# Patient Record
Sex: MALE | Race: WHITE | NOT HISPANIC OR LATINO | Employment: OTHER | URBAN - METROPOLITAN AREA
[De-identification: names, ages, dates, MRNs, and addresses within clinical notes are randomized per-mention and may not be internally consistent; named-entity substitution may affect disease eponyms.]

---

## 2018-10-15 ENCOUNTER — HOSPITAL ENCOUNTER (EMERGENCY)
Facility: HOSPITAL | Age: 63
Discharge: HOME | End: 2018-10-15
Attending: EMERGENCY MEDICINE
Payer: COMMERCIAL

## 2018-10-15 VITALS
TEMPERATURE: 98.5 F | SYSTOLIC BLOOD PRESSURE: 106 MMHG | RESPIRATION RATE: 16 BRPM | HEART RATE: 62 BPM | DIASTOLIC BLOOD PRESSURE: 62 MMHG | OXYGEN SATURATION: 98 %

## 2018-10-15 DIAGNOSIS — R19.7 DIARRHEA: Primary | ICD-10-CM

## 2018-10-15 LAB
ALBUMIN SERPL BCP-MCNC: 3.9 G/DL (ref 3.5–5)
ALP SERPL-CCNC: 95 U/L (ref 46–116)
ALT SERPL W P-5'-P-CCNC: 60 U/L (ref 12–78)
ANION GAP SERPL CALCULATED.3IONS-SCNC: 10 MMOL/L (ref 4–13)
AST SERPL W P-5'-P-CCNC: 16 U/L (ref 5–45)
ATRIAL RATE: 64 BPM
BASOPHILS # BLD AUTO: 0.01 THOUSANDS/ΜL (ref 0–0.1)
BASOPHILS NFR BLD AUTO: 0 % (ref 0–1)
BILIRUB SERPL-MCNC: 0.5 MG/DL (ref 0.2–1)
BUN SERPL-MCNC: 22 MG/DL (ref 5–25)
CALCIUM SERPL-MCNC: 8.8 MG/DL (ref 8.3–10.1)
CHLORIDE SERPL-SCNC: 104 MMOL/L (ref 100–108)
CO2 SERPL-SCNC: 19 MMOL/L (ref 21–32)
CREAT SERPL-MCNC: 1.03 MG/DL (ref 0.6–1.3)
EOSINOPHIL # BLD AUTO: 0.02 THOUSAND/ΜL (ref 0–0.61)
EOSINOPHIL NFR BLD AUTO: 1 % (ref 0–6)
ERYTHROCYTE [DISTWIDTH] IN BLOOD BY AUTOMATED COUNT: 13.7 % (ref 11.6–15.1)
GFR SERPL CREATININE-BSD FRML MDRD: 77 ML/MIN/1.73SQ M
GLUCOSE SERPL-MCNC: 115 MG/DL (ref 65–140)
HCT VFR BLD AUTO: 45 % (ref 36.5–49.3)
HGB BLD-MCNC: 15.2 G/DL (ref 12–17)
IMM GRANULOCYTES # BLD AUTO: 0.01 THOUSAND/UL (ref 0–0.2)
IMM GRANULOCYTES NFR BLD AUTO: 0 % (ref 0–2)
LIPASE SERPL-CCNC: 116 U/L (ref 73–393)
LYMPHOCYTES # BLD AUTO: 0.51 THOUSANDS/ΜL (ref 0.6–4.47)
LYMPHOCYTES NFR BLD AUTO: 12 % (ref 14–44)
MAGNESIUM SERPL-MCNC: 1.8 MG/DL (ref 1.6–2.6)
MCH RBC QN AUTO: 31.1 PG (ref 26.8–34.3)
MCHC RBC AUTO-ENTMCNC: 33.8 G/DL (ref 31.4–37.4)
MCV RBC AUTO: 92 FL (ref 82–98)
MONOCYTES # BLD AUTO: 0.62 THOUSAND/ΜL (ref 0.17–1.22)
MONOCYTES NFR BLD AUTO: 14 % (ref 4–12)
NEUTROPHILS # BLD AUTO: 3.13 THOUSANDS/ΜL (ref 1.85–7.62)
NEUTS SEG NFR BLD AUTO: 73 % (ref 43–75)
NRBC BLD AUTO-RTO: 0 /100 WBCS
P AXIS: 60 DEGREES
PHOSPHATE SERPL-MCNC: 2.5 MG/DL (ref 2.3–4.1)
PLATELET # BLD AUTO: 143 THOUSANDS/UL (ref 149–390)
PMV BLD AUTO: 10.2 FL (ref 8.9–12.7)
POTASSIUM SERPL-SCNC: 4.3 MMOL/L (ref 3.5–5.3)
PR INTERVAL: 230 MS
PROT SERPL-MCNC: 7.3 G/DL (ref 6.4–8.2)
QRS AXIS: -15 DEGREES
QRSD INTERVAL: 86 MS
QT INTERVAL: 384 MS
QTC INTERVAL: 396 MS
RBC # BLD AUTO: 4.89 MILLION/UL (ref 3.88–5.62)
SODIUM SERPL-SCNC: 133 MMOL/L (ref 136–145)
T WAVE AXIS: 52 DEGREES
VENTRICULAR RATE: 64 BPM
WBC # BLD AUTO: 4.3 THOUSAND/UL (ref 4.31–10.16)

## 2018-10-15 PROCEDURE — 83735 ASSAY OF MAGNESIUM: CPT | Performed by: EMERGENCY MEDICINE

## 2018-10-15 PROCEDURE — 93005 ELECTROCARDIOGRAM TRACING: CPT

## 2018-10-15 PROCEDURE — 96360 HYDRATION IV INFUSION INIT: CPT

## 2018-10-15 PROCEDURE — 84100 ASSAY OF PHOSPHORUS: CPT | Performed by: EMERGENCY MEDICINE

## 2018-10-15 PROCEDURE — 99284 EMERGENCY DEPT VISIT MOD MDM: CPT

## 2018-10-15 PROCEDURE — 36415 COLL VENOUS BLD VENIPUNCTURE: CPT | Performed by: EMERGENCY MEDICINE

## 2018-10-15 PROCEDURE — 85025 COMPLETE CBC W/AUTO DIFF WBC: CPT | Performed by: EMERGENCY MEDICINE

## 2018-10-15 PROCEDURE — 83690 ASSAY OF LIPASE: CPT | Performed by: EMERGENCY MEDICINE

## 2018-10-15 PROCEDURE — 93010 ELECTROCARDIOGRAM REPORT: CPT | Performed by: INTERNAL MEDICINE

## 2018-10-15 PROCEDURE — 80053 COMPREHEN METABOLIC PANEL: CPT | Performed by: EMERGENCY MEDICINE

## 2018-10-15 RX ORDER — DICYCLOMINE HCL 20 MG
20 TABLET ORAL ONCE
Status: COMPLETED | OUTPATIENT
Start: 2018-10-15 | End: 2018-10-15

## 2018-10-15 RX ADMIN — DICYCLOMINE HYDROCHLORIDE 20 MG: 20 TABLET ORAL at 08:30

## 2018-10-15 RX ADMIN — SODIUM CHLORIDE 1000 ML: 0.9 INJECTION, SOLUTION INTRAVENOUS at 08:49

## 2018-10-15 NOTE — DISCHARGE INSTRUCTIONS
Acute Diarrhea   WHAT YOU NEED TO KNOW:   What is acute diarrhea? Acute diarrhea starts quickly and lasts a short time, usually 1 to 3 days  It can last up to 2 weeks  What causes acute diarrhea? · Bacteria, such as E coli or salmonella    · Viruses, such as rotavirus and norovirus    · A parasite, such as giardia    · Medicines, such as laxatives, antacids, or antibiotics    · An allergy to lactose, soy, or gluten    · Eating food or drinking water that contains germs    · Medical treatments, such as chemotherapy or radiation  What other signs and symptoms might I have with acute diarrhea? You may have 3 or more episodes of diarrhea  It may be hard to control your diarrhea  You may also have any of the following:  · Fever and chills    · Headache or abdominal pain    · Nausea and vomiting    · Symptoms of dehydration such as thirst, decreased urination, dry skin, sunken eyes, or fast, pounding heartbeat  What does my healthcare provider need to know about my acute diarrhea? Your healthcare provider will ask about your symptoms  He or she will ask what you have recently eaten and if you have traveled to other countries  Tell the provider what medicines you use or if you have been around anyone who is sick  Your healthcare provider may check you for signs of dehydration  How is acute diarrhea treated? Acute diarrhea usually gets better without treatment  You may need any of the following if your diarrhea is severe or lasts longer than a few days:  · Diarrhea medicine  is an over-the-counter medicine that helps slow or stop your diarrhea  · Antibiotics  may be given to help treat an infection caused by bacteria  · Parasite medicine  may be given to treat an infection caused by parasites  How can acute diarrhea be managed? · Drink liquids as directed  Liquids will help prevent dehydration caused by diarrhea   Ask your healthcare provider how much liquid to drink each day and which liquids are best for you  You may need to drink an oral rehydration solution (ORS)  An ORS has the right amounts of water, salts, and sugar you need to replace body fluids  You can buy an ORS at most grocery stores and pharmacies  · Eat foods that are easy to digest   Examples include rice, lentils, cereal, bananas, potatoes, and bread  It also includes some fruits (bananas, melon), well-cooked vegetables, and lean meats  Avoid foods high in fiber, fat, and sugar  Also avoid caffeine, alcohol, dairy, and red meat until your diarrhea is gone  How can acute diarrhea be prevented? · Wash your hands often  Use soap and water  Wash your hands before you eat or prepare food  Also wash your hands after you use the bathroom  Use an alcohol-based hand gel when soap and water are not available  · Keep bathroom surfaces clean  This helps prevent the spread of germs that cause acute diarrhea  · Wash fruits and vegetables well before you eat them  This can help remove germs that cause diarrhea  If possible, remove the skin from fruits and vegetables, or cook them well before you eat them  · Cook meat as directed  ¨ Cook ground meat  to 160°F      ¨ Cook ground poultry, whole poultry, or cuts of poultry  to at least 165°F  Remove the meat from heat  Let it stand for 3 minutes before you eat it  ¨ Cook whole cuts of meat other than poultry  to at least 145°F  Remove the meat from heat  Let it stand for 3 minutes before you eat it  · Wash dishes that have touched raw meat with hot water and soap  This includes cutting boards, utensils, dishes, and serving containers  · Place raw or cooked meat in the refrigerator as soon as possible  Bacteria can grow in meat that is left at room temperature too long  · Do not eat raw or undercooked oysters, clams, or mussels  These foods may be contaminated and cause infection  · Drink filtered or treated water only when you travel  Do not put ice in your drinks  Drink bottled water whenever possible  When should I seek immediate care? · You feel confused  · Your heartbeat is faster than normal      · Your eyes look deeply sunken, or you have no tears when you cry  · You urinate less than usual, or your urine is dark yellow  · You have blood or mucus in your stools  · You have severe abdominal pain  · You are unable to drink any liquids  When should I contact my healthcare provider? · Your symptoms do not get better with treatment  · You have a fever higher than 101 3°F (38 5°C)  · You have trouble eating and drinking because you are vomiting  · You are thirsty or have a dry mouth  · Your diarrhea does not get better in 7 days  · You have questions or concerns about your condition or care  CARE AGREEMENT:   You have the right to help plan your care  Learn about your health condition and how it may be treated  Discuss treatment options with your caregivers to decide what care you want to receive  You always have the right to refuse treatment  The above information is an  only  It is not intended as medical advice for individual conditions or treatments  Talk to your doctor, nurse or pharmacist before following any medical regimen to see if it is safe and effective for you  © 2017 [employer]  Information is for End User's use only and may not be sold, redistributed or otherwise used for commercial purposes  All illustrations and images included in CareNotes® are the copyrighted property of A [employer] M [employer]  or Francisco Swenson

## 2018-10-15 NOTE — ED PROVIDER NOTES
Pt Name: Maverick Jon  MRN: [id number]  [place] 1955  Age/Sex: 58 y o  male  Date of evaluation: 10/15/2018  PCP: Lei Barry, [address] [place] [address]    Chief Complaint   Patient presents with    Diarrhea     pt with diarrhea since Friday  Unimproved symptoms since  HPI    Gil Floor presents to the Emergency Department complaining of diarrhea  No nausea or vomiting  He has had several days of watery diarrhea and then had started to feel better  The diarrhea returned after he ate pasta with sausage and peppers  HPI      Past Medical and Surgical History    Past Medical History:   Diagnosis Date    Mehta esophagus     Gastric bypass status for obesity     Hypertension        Past Surgical History:   Procedure Laterality Date    TOTAL HIP ARTHROPLASTY Right        History reviewed  No pertinent family history  Social History   Substance Use Topics    Smoking status: Former Smoker    Smokeless tobacco: Never Used    Alcohol use No              Allergies    Allergies   Allergen Reactions    Penicillins        Home Medications    Prior to Admission medications    Not on File           Review of Systems    Review of Systems   Constitutional: Negative for activity change, appetite change, chills, fatigue and fever  HENT: Negative for congestion, rhinorrhea, sinus pressure, sneezing, sore throat and trouble swallowing  Eyes: Negative for photophobia and visual disturbance  Respiratory: Negative for chest tightness, shortness of breath and wheezing  Cardiovascular: Negative for chest pain and leg swelling  Gastrointestinal: Positive for diarrhea  Negative for abdominal distention, abdominal pain, constipation, nausea and vomiting  Endocrine: Negative for polydipsia, polyphagia and polyuria  Genitourinary: Negative for decreased urine volume, difficulty urinating, dysuria, flank pain, frequency and urgency     Musculoskeletal: Negative for back pain, gait problem, joint swelling and neck pain  Skin: Negative for color change, pallor and rash  Allergic/Immunologic: Negative for immunocompromised state  Neurological: Negative for seizures, syncope, speech difficulty, weakness, light-headedness and headaches  Psychiatric/Behavioral: Negative for confusion  All other systems reviewed and are negative  Physical Exam      ED Triage Vitals [10/15/18 0801]   Temperature Pulse Respirations Blood Pressure SpO2   98 5 °F (36 9 °C) 68 18 114/66 98 %      Temp Source Heart Rate Source Patient Position - Orthostatic VS BP Location FiO2 (%)   Oral Monitor Sitting Right arm --      Pain Score       No Pain               Physical Exam   Constitutional: He is oriented to person, place, and time  He appears well-developed and well-nourished  No distress  HENT:   Head: Normocephalic and atraumatic  Nose: Nose normal    Mouth/Throat: Oropharynx is clear and moist    Eyes: Pupils are equal, round, and reactive to light  Conjunctivae and EOM are normal    Neck: Normal range of motion  Neck supple  Cardiovascular: Normal rate, regular rhythm and normal heart sounds  Exam reveals no gallop and no friction rub  No murmur heard  Pulmonary/Chest: Effort normal and breath sounds normal  No respiratory distress  He has no wheezes  He has no rales  Abdominal: Soft  Bowel sounds are normal  There is no tenderness  There is no rebound and no guarding  Musculoskeletal: Normal range of motion  Neurological: He is alert and oriented to person, place, and time  Skin: Skin is warm and dry  He is not diaphoretic  Psychiatric: He has a normal mood and affect  His behavior is normal    Nursing note and vitals reviewed  Assessment and Plan    Ana Wyatt is a 58 y o  male who presents with diarrhea and feeling weak  Physical examination unremarkable  Differential diagnosis (not completely inclusive) includes viral/ food borne   Plan will be to perform diagnostic testing and treat symptomatically  MDM    Diagnostic Results      EKG INTERPRETATION  EKG Interpretation    Rate: 64 BPM  Rhythm: sinus  Axis: normal  Intervals: Normal, no blocks, QTc 396ms  T waves: non-specific  ST segments: non-specific    Impression: non-diagnostic EKG  EKG for comparison: none available  EKG interpreted by me  Interpretation by Michael Todd DO  EKG reviewed and interpreted independently      Labs:    Results for orders placed or performed during the hospital encounter of 10/15/18   CBC and differential   Result Value Ref Range    WBC 4 30 (L) 4 31 - 10 16 Thousand/uL    RBC 4 89 3 88 - 5 62 Million/uL    Hemoglobin 15 2 12 0 - 17 0 g/dL    Hematocrit 45 0 36 5 - 49 3 %    MCV 92 82 - 98 fL    MCH 31 1 26 8 - 34 3 pg    MCHC 33 8 31 4 - 37 4 g/dL    RDW 13 7 11 6 - 15 1 %    MPV 10 2 8 9 - 12 7 fL    Platelets 369 (L) 368 - 390 Thousands/uL    nRBC 0 /100 WBCs    Neutrophils Relative 73 43 - 75 %    Immat GRANS % 0 0 - 2 %    Lymphocytes Relative 12 (L) 14 - 44 %    Monocytes Relative 14 (H) 4 - 12 %    Eosinophils Relative 1 0 - 6 %    Basophils Relative 0 0 - 1 %    Neutrophils Absolute 3 13 1 85 - 7 62 Thousands/µL    Immature Grans Absolute 0 01 0 00 - 0 20 Thousand/uL    Lymphocytes Absolute 0 51 (L) 0 60 - 4 47 Thousands/µL    Monocytes Absolute 0 62 0 17 - 1 22 Thousand/µL    Eosinophils Absolute 0 02 0 00 - 0 61 Thousand/µL    Basophils Absolute 0 01 0 00 - 0 10 Thousands/µL   Comprehensive metabolic panel   Result Value Ref Range    Sodium 133 (L) 136 - 145 mmol/L    Potassium 4 3 3 5 - 5 3 mmol/L    Chloride 104 100 - 108 mmol/L    CO2 19 (L) 21 - 32 mmol/L    ANION GAP 10 4 - 13 mmol/L    BUN 22 5 - 25 mg/dL    Creatinine 1 03 0 60 - 1 30 mg/dL    Glucose 115 65 - 140 mg/dL    Calcium 8 8 8 3 - 10 1 mg/dL    AST 16 5 - 45 U/L    ALT 60 12 - 78 U/L    Alkaline Phosphatase 95 46 - 116 U/L    Total Protein 7 3 6 4 - 8 2 g/dL    Albumin 3 9 3 5 - 5 0 g/dL    Total Bilirubin 0  50 0 20 - 1 00 mg/dL    eGFR 77 ml/min/1 73sq m   Lipase   Result Value Ref Range    Lipase 116 73 - 393 u/L   Phosphorus   Result Value Ref Range    Phosphorus 2 5 2 3 - 4 1 mg/dL   Magnesium   Result Value Ref Range    Magnesium 1 8 1 6 - 2 6 mg/dL   ECG 12 lead   Result Value Ref Range    Ventricular Rate 64 BPM    Atrial Rate 64 BPM    CO Interval 230 ms    QRSD Interval 86 ms    QT Interval 384 ms    QTC Interval 396 ms    P Axis 60 degrees    QRS Axis -15 degrees    T Wave Axis 52 degrees       All labs reviewed and utilized in the medical decision making process    Radiology:    No orders to display       All radiology studies independently viewed by me and interpreted by the radiologist     Procedure    Procedures    CritCare Time      ED Course of Care and Re-Assessments      Medications   sodium chloride 0 9 % bolus 1,000 mL (0 mL Intravenous Stopped 10/15/18 1003)   dicyclomine (BENTYL) tablet 20 mg (20 mg Oral Given 10/15/18 0830)           FINAL IMPRESSION    Final diagnoses:   Diarrhea         DISPOSITION/PLAN    Time reflects when diagnosis was documented in both MDM as applicable and the Disposition within this note     Time User Action Codes Description Comment    10/15/2018  9:46 AM James Mcgill Add [R19 7] Diarrhea       ED Disposition     ED Disposition Condition Comment    Discharge  Trevor Haynes discharge to home/self care      Condition at discharge: Good        Follow-up Information     Follow up With Specialties Details Why Contact Info Additional [address] Emergency Department Emergency Medicine Go to As needed, If symptoms worsen [address] [facility]  AN ED,  [address], [place], [place], [address]            PATIENT REFERRED TO:    [facility]  [address] [facility] [address] [phone]  Go to  As needed, If symptoms worsen      DISCHARGE MEDICATIONS:    There are no discharge medications for this patient  No discharge procedures on file           Junior Mays, [address] [facility],   10/15/18 6453

## 2019-02-05 ENCOUNTER — APPOINTMENT (EMERGENCY)
Dept: RADIOLOGY | Facility: HOSPITAL | Age: 64
End: 2019-02-05
Payer: COMMERCIAL

## 2019-02-05 ENCOUNTER — HOSPITAL ENCOUNTER (OUTPATIENT)
Facility: HOSPITAL | Age: 64
Setting detail: OBSERVATION
Discharge: HOME | End: 2019-02-06
Attending: EMERGENCY MEDICINE | Admitting: INTERNAL MEDICINE
Payer: COMMERCIAL

## 2019-02-05 DIAGNOSIS — R94.31 ABNORMAL EKG: ICD-10-CM

## 2019-02-05 DIAGNOSIS — R77.8 ELEVATED TROPONIN: ICD-10-CM

## 2019-02-05 DIAGNOSIS — R07.9 CHEST PAIN: Primary | ICD-10-CM

## 2019-02-05 DIAGNOSIS — E86.0 DEHYDRATION: ICD-10-CM

## 2019-02-05 PROBLEM — I10 ESSENTIAL HYPERTENSION: Status: ACTIVE | Noted: 2019-02-05

## 2019-02-05 PROBLEM — Z98.84 GASTRIC BYPASS STATUS FOR OBESITY: Status: ACTIVE | Noted: 2019-02-05

## 2019-02-05 PROBLEM — K22.70 BARRETT ESOPHAGUS: Status: ACTIVE | Noted: 2019-02-05

## 2019-02-05 LAB
ALBUMIN SERPL BCP-MCNC: 3.7 G/DL (ref 3.5–5)
ALP SERPL-CCNC: 82 U/L (ref 46–116)
ALT SERPL W P-5'-P-CCNC: 58 U/L (ref 12–78)
ANION GAP SERPL CALCULATED.3IONS-SCNC: 9 MMOL/L (ref 4–13)
APTT PPP: 28 SECONDS (ref 26–38)
AST SERPL W P-5'-P-CCNC: 20 U/L (ref 5–45)
ATRIAL RATE: 48 BPM
ATRIAL RATE: 54 BPM
BASOPHILS # BLD AUTO: 0.03 THOUSANDS/ΜL (ref 0–0.1)
BASOPHILS NFR BLD AUTO: 1 % (ref 0–1)
BILIRUB SERPL-MCNC: 0.3 MG/DL (ref 0.2–1)
BUN SERPL-MCNC: 23 MG/DL (ref 5–25)
CALCIUM SERPL-MCNC: 9.2 MG/DL (ref 8.3–10.1)
CHLORIDE SERPL-SCNC: 106 MMOL/L (ref 100–108)
CHOLEST SERPL-MCNC: 97 MG/DL (ref 50–200)
CK MB SERPL-MCNC: 2 % (ref 0–2.5)
CK MB SERPL-MCNC: 3.3 NG/ML (ref 0–5)
CK SERPL-CCNC: 164 U/L (ref 39–308)
CO2 SERPL-SCNC: 29 MMOL/L (ref 21–32)
CREAT SERPL-MCNC: 0.87 MG/DL (ref 0.6–1.3)
EOSINOPHIL # BLD AUTO: 0.14 THOUSAND/ΜL (ref 0–0.61)
EOSINOPHIL NFR BLD AUTO: 3 % (ref 0–6)
ERYTHROCYTE [DISTWIDTH] IN BLOOD BY AUTOMATED COUNT: 12.7 % (ref 11.6–15.1)
GFR SERPL CREATININE-BSD FRML MDRD: 92 ML/MIN/1.73SQ M
GLUCOSE SERPL-MCNC: 102 MG/DL (ref 65–140)
HCT VFR BLD AUTO: 41.5 % (ref 36.5–49.3)
HDLC SERPL-MCNC: 28 MG/DL (ref 40–60)
HGB BLD-MCNC: 13.7 G/DL (ref 12–17)
IMM GRANULOCYTES # BLD AUTO: 0 THOUSAND/UL (ref 0–0.2)
IMM GRANULOCYTES NFR BLD AUTO: 0 % (ref 0–2)
INR PPP: 1.03 (ref 0.86–1.17)
LDLC SERPL CALC-MCNC: 50 MG/DL (ref 0–100)
LIPASE SERPL-CCNC: 237 U/L (ref 73–393)
LYMPHOCYTES # BLD AUTO: 1.68 THOUSANDS/ΜL (ref 0.6–4.47)
LYMPHOCYTES NFR BLD AUTO: 38 % (ref 14–44)
MCH RBC QN AUTO: 30.9 PG (ref 26.8–34.3)
MCHC RBC AUTO-ENTMCNC: 33 G/DL (ref 31.4–37.4)
MCV RBC AUTO: 94 FL (ref 82–98)
MONOCYTES # BLD AUTO: 0.43 THOUSAND/ΜL (ref 0.17–1.22)
MONOCYTES NFR BLD AUTO: 10 % (ref 4–12)
NEUTROPHILS # BLD AUTO: 2.16 THOUSANDS/ΜL (ref 1.85–7.62)
NEUTS SEG NFR BLD AUTO: 48 % (ref 43–75)
NONHDLC SERPL-MCNC: 69 MG/DL
NRBC BLD AUTO-RTO: 0 /100 WBCS
P AXIS: 47 DEGREES
P AXIS: 53 DEGREES
PLATELET # BLD AUTO: 138 THOUSANDS/UL (ref 149–390)
PMV BLD AUTO: 10.3 FL (ref 8.9–12.7)
POTASSIUM SERPL-SCNC: 4.2 MMOL/L (ref 3.5–5.3)
PR INTERVAL: 246 MS
PR INTERVAL: 260 MS
PROT SERPL-MCNC: 6.8 G/DL (ref 6.4–8.2)
PROTHROMBIN TIME: 13.2 SECONDS (ref 11.8–14.2)
QRS AXIS: -32 DEGREES
QRS AXIS: -48 DEGREES
QRSD INTERVAL: 88 MS
QRSD INTERVAL: 98 MS
QT INTERVAL: 416 MS
QT INTERVAL: 474 MS
QTC INTERVAL: 394 MS
QTC INTERVAL: 423 MS
RBC # BLD AUTO: 4.43 MILLION/UL (ref 3.88–5.62)
SODIUM SERPL-SCNC: 144 MMOL/L (ref 136–145)
T WAVE AXIS: 41 DEGREES
T WAVE AXIS: 52 DEGREES
TRIGL SERPL-MCNC: 96 MG/DL
TROPONIN I SERPL-MCNC: 0.03 NG/ML
TROPONIN I SERPL-MCNC: 0.03 NG/ML
TROPONIN I SERPL-MCNC: 0.04 NG/ML
TROPONIN I SERPL-MCNC: 0.05 NG/ML
VENTRICULAR RATE: 48 BPM
VENTRICULAR RATE: 54 BPM
WBC # BLD AUTO: 4.44 THOUSAND/UL (ref 4.31–10.16)

## 2019-02-05 PROCEDURE — 84484 ASSAY OF TROPONIN QUANT: CPT | Performed by: INTERNAL MEDICINE

## 2019-02-05 PROCEDURE — 83690 ASSAY OF LIPASE: CPT | Performed by: EMERGENCY MEDICINE

## 2019-02-05 PROCEDURE — 85730 THROMBOPLASTIN TIME PARTIAL: CPT | Performed by: EMERGENCY MEDICINE

## 2019-02-05 PROCEDURE — 82550 ASSAY OF CK (CPK): CPT | Performed by: EMERGENCY MEDICINE

## 2019-02-05 PROCEDURE — 93010 ELECTROCARDIOGRAM REPORT: CPT | Performed by: INTERNAL MEDICINE

## 2019-02-05 PROCEDURE — 71045 X-RAY EXAM CHEST 1 VIEW: CPT

## 2019-02-05 PROCEDURE — 80061 LIPID PANEL: CPT | Performed by: PHYSICIAN ASSISTANT

## 2019-02-05 PROCEDURE — 85025 COMPLETE CBC W/AUTO DIFF WBC: CPT | Performed by: EMERGENCY MEDICINE

## 2019-02-05 PROCEDURE — 80053 COMPREHEN METABOLIC PANEL: CPT | Performed by: EMERGENCY MEDICINE

## 2019-02-05 PROCEDURE — 84484 ASSAY OF TROPONIN QUANT: CPT | Performed by: EMERGENCY MEDICINE

## 2019-02-05 PROCEDURE — 99285 EMERGENCY DEPT VISIT HI MDM: CPT

## 2019-02-05 PROCEDURE — 36415 COLL VENOUS BLD VENIPUNCTURE: CPT | Performed by: EMERGENCY MEDICINE

## 2019-02-05 PROCEDURE — 84484 ASSAY OF TROPONIN QUANT: CPT | Performed by: PHYSICIAN ASSISTANT

## 2019-02-05 PROCEDURE — 93005 ELECTROCARDIOGRAM TRACING: CPT

## 2019-02-05 PROCEDURE — 85610 PROTHROMBIN TIME: CPT | Performed by: EMERGENCY MEDICINE

## 2019-02-05 PROCEDURE — 99220 PR INITIAL OBSERVATION CARE/DAY 70 MINUTES: CPT | Performed by: INTERNAL MEDICINE

## 2019-02-05 PROCEDURE — 82553 CREATINE MB FRACTION: CPT | Performed by: EMERGENCY MEDICINE

## 2019-02-05 RX ORDER — ATORVASTATIN CALCIUM 10 MG/1
10 TABLET, FILM COATED ORAL
Status: DISCONTINUED | OUTPATIENT
Start: 2019-02-05 | End: 2019-02-06 | Stop reason: HOSPADM

## 2019-02-05 RX ORDER — ASPIRIN 325 MG
325 TABLET ORAL DAILY
COMMUNITY

## 2019-02-05 RX ORDER — ONDANSETRON 2 MG/ML
4 INJECTION INTRAMUSCULAR; INTRAVENOUS EVERY 8 HOURS PRN
Status: DISCONTINUED | OUTPATIENT
Start: 2019-02-05 | End: 2019-02-06 | Stop reason: HOSPADM

## 2019-02-05 RX ORDER — METOPROLOL TARTRATE 50 MG/1
50 TABLET, FILM COATED ORAL EVERY 12 HOURS SCHEDULED
Status: DISCONTINUED | OUTPATIENT
Start: 2019-02-05 | End: 2019-02-06 | Stop reason: HOSPADM

## 2019-02-05 RX ORDER — LISINOPRIL 10 MG/1
10 TABLET ORAL DAILY
Status: DISCONTINUED | OUTPATIENT
Start: 2019-02-05 | End: 2019-02-06 | Stop reason: HOSPADM

## 2019-02-05 RX ORDER — ALLOPURINOL 300 MG/1
300 TABLET ORAL DAILY
Status: DISCONTINUED | OUTPATIENT
Start: 2019-02-05 | End: 2019-02-06 | Stop reason: HOSPADM

## 2019-02-05 RX ORDER — ACETAMINOPHEN 325 MG/1
650 TABLET ORAL EVERY 4 HOURS PRN
Status: DISCONTINUED | OUTPATIENT
Start: 2019-02-05 | End: 2019-02-06 | Stop reason: HOSPADM

## 2019-02-05 RX ORDER — SODIUM CHLORIDE 9 MG/ML
125 INJECTION, SOLUTION INTRAVENOUS CONTINUOUS
Status: DISCONTINUED | OUTPATIENT
Start: 2019-02-05 | End: 2019-02-05

## 2019-02-05 RX ORDER — ASPIRIN 325 MG
325 TABLET ORAL DAILY
Status: DISCONTINUED | OUTPATIENT
Start: 2019-02-05 | End: 2019-02-06 | Stop reason: HOSPADM

## 2019-02-05 RX ORDER — DOXAZOSIN MESYLATE 1 MG/1
1 TABLET ORAL DAILY
Status: DISCONTINUED | OUTPATIENT
Start: 2019-02-05 | End: 2019-02-06 | Stop reason: HOSPADM

## 2019-02-05 RX ADMIN — ATORVASTATIN CALCIUM 10 MG: 10 TABLET, FILM COATED ORAL at 16:01

## 2019-02-05 RX ADMIN — DOXAZOSIN 1 MG: 1 TABLET ORAL at 08:22

## 2019-02-05 RX ADMIN — NITROGLYCERIN 0.5 INCH: 20 OINTMENT TOPICAL at 01:26

## 2019-02-05 RX ADMIN — ALLOPURINOL 300 MG: 300 TABLET ORAL at 08:21

## 2019-02-05 RX ADMIN — SODIUM CHLORIDE 125 ML/HR: 0.9 INJECTION, SOLUTION INTRAVENOUS at 03:57

## 2019-02-05 RX ADMIN — SODIUM CHLORIDE 500 ML: 0.9 INJECTION, SOLUTION INTRAVENOUS at 02:35

## 2019-02-05 RX ADMIN — LISINOPRIL 10 MG: 10 TABLET ORAL at 08:21

## 2019-02-05 RX ADMIN — METOPROLOL TARTRATE 50 MG: 50 TABLET, FILM COATED ORAL at 08:21

## 2019-02-05 NOTE — H&P
H&P- Martha Duque 1955, 61 y o  male MRN: [id number]    Unit/Bed#: -01 Encounter: [id number]    Primary Care Provider: Avis Breaux DO   Date and time admitted to hospital: 2/5/2019 12:58 AM    * Chest pain   Assessment & Plan    · DIANELYS score 1, per ED provider pt needs admission for ACS r/o  · Initial trop negative, EKG nonischemic  · Lipase negative, CMP and CBC wnl  · CXR without cardiopulmonary pathology   · Will monitor on tele  · Trend trop x3  · Serial EKGs  · Last lipid panel Jan 2018, grossly wnl  · Will obtain repeat panel      Essential hypertension   Assessment & Plan    · Stable, continue Metoprolol and linsinopril       VTE Prophylaxis: low risk, not indicated  / reason for no mechanical VTE prophylaxis : ambulate   Code Status: FULL  POLST: POLST form is not discussed and not completed at this time  Discussion with family: none    Anticipated Length of Stay:  Patient will be admitted on an Observation basis with an anticipated length of stay of  < 2 midnights  Justification for Hospital Stay: per plan above    Total Time for Visit, including Counseling / Coordination of Care: 30 minutes  Greater than 50% of this total time spent on direct patient counseling and coordination of care  Chief Complaint:   Chest pain    History of Present Illness:    Martha Duque is a 61 y o  male with PMHx of HTN who presents with chest pain  Patient states he woke up to the bathroom last night and developed an Aaron right-sided chest pain that radiated down into his right arm  He states this lasts about 45 min and began to resolve upon arrival to the ED without any intervention  He does state he took an aspirin pre-hospital   Reports some improvement with nitro but says his chest pain was already improving at this point  He denies any shortness of breath during it episode of chest pain but does note a brief, mild episode of shortness of breath earlier that day    Denies nausea or vomiting, denies paresthesias  He does admit to some diaphoresis at that time  He admits to intermittent lower extremity edema that is no worse than usual today  Denies any cough, congestion, fever or chills  Denies any strenuous upper body activity or chest tenderness  Denies any excessive consumption of acidic or spicy foods  Denies headache or dizziness  Denies abdominal pain or diarrhea  Denies urinary symptoms  Patient quit smoking about 20 years ago although he has a 20 year history of daily smoking prior to this  No family history of cardiac disease  Review of Systems:    Review of Systems   Constitutional: Positive for diaphoresis  HENT: Negative  Eyes: Negative  Respiratory: Positive for shortness of breath  Cardiovascular: Positive for chest pain  Gastrointestinal: Negative  Endocrine: Negative  Genitourinary: Negative  Musculoskeletal: Negative  Allergic/Immunologic: Negative  Neurological: Negative  Hematological: Negative  Psychiatric/Behavioral: Negative  Past Medical and Surgical History:     Past Medical History:   Diagnosis Date    Mehta esophagus     Gastric bypass status for obesity     Hypertension        Past Surgical History:   Procedure Laterality Date    TOTAL HIP ARTHROPLASTY Right        Meds/Allergies:    Prior to Admission medications    Medication Sig Start Date End Date Taking?  Authorizing Provider   ALLOPURINOL PO Take by mouth   Yes Historical Provider, MD   aspirin 325 mg tablet Take 325 mg by mouth daily   Yes Historical Provider, MD   Atorvastatin Calcium (LIPITOR PO) Take by mouth   Yes Historical Provider, MD   Cimetidine (TAGAMET PO) Take by mouth   Yes Historical Provider, MD   Cyanocobalamin (B-12 PO) Take by mouth   Yes Historical Provider, MD   DOXAZOSIN MESYLATE PO Take by mouth   Yes Historical Provider, MD   LISINOPRIL PO Take by mouth   Yes Historical Provider, MD   METOPROLOL TARTRATE PO Take by mouth   Yes Historical Provider, MD   Multiple Vitamins-Minerals (MULTIVITAMIN ADULTS PO) Take by mouth   Yes Historical Provider, MD     I have reviewed home medications with patient personally  Allergies: Allergies   Allergen Reactions    Penicillins        Social History:     Marital Status: /Civil Union   Occupation: not discussed  Patient Pre-hospital Living Situation: home  Patient Pre-hospital Level of Mobility: independent  Patient Pre-hospital Diet Restrictions: none  Substance Use History:   History   Alcohol Use No     History   Smoking Status    Former Smoker   Smokeless Tobacco    Never Used     History   Drug Use No       Family History:    non-contributory    Physical Exam:     Vitals:   Blood Pressure: 135/65 (02/05/19 0333)  Pulse: 55 (02/05/19 0333)  Temperature: 97 7 °F (36 5 °C) (02/05/19 0333)  Temp Source: Oral (02/05/19 0333)  Respirations: 18 (02/05/19 0333)  Height: 5' 10" (177 8 cm) (02/05/19 0333)  Weight - Scale: 109 kg (240 lb) (02/05/19 0333)  SpO2: 98 % (02/05/19 0333)    Physical Exam   Constitutional: He appears well-developed and well-nourished  HENT:   Head: Normocephalic  Mouth/Throat: Oropharynx is clear and moist    Cardiovascular: Regular rhythm, normal heart sounds and intact distal pulses  Exam reveals no gallop and no friction rub  No murmur heard  Bradycardic   Pulmonary/Chest: Effort normal and breath sounds normal  No respiratory distress  He has no wheezes  He has no rales  He exhibits no tenderness  Abdominal: Soft  Bowel sounds are normal  He exhibits no distension and no mass  There is no tenderness  There is no rebound and no guarding  Musculoskeletal: He exhibits edema (1+ pitting pedal and pretibial edema bilateral lower extremities  Symmetric  No erythema or warmth )  He exhibits no tenderness  Neurological: He is alert  Skin: Skin is warm and dry  No rash noted  He is not diaphoretic  No erythema  No pallor     Psychiatric: He has a normal mood and affect  His behavior is normal    Nursing note and vitals reviewed  Additional Data:     Lab Results: I have personally reviewed pertinent reports  Results from last 7 days  Lab Units 02/05/19  0121   WBC Thousand/uL 4 44   HEMOGLOBIN g/dL 13 7   HEMATOCRIT % 41 5   PLATELETS Thousands/uL 138*   NEUTROS PCT % 48   LYMPHS PCT % 38   MONOS PCT % 10   EOS PCT % 3       Results from last 7 days  Lab Units 02/05/19  0121   SODIUM mmol/L 144   POTASSIUM mmol/L 4 2   CHLORIDE mmol/L 106   CO2 mmol/L 29   BUN mg/dL 23   CREATININE mg/dL 0 87   ANION GAP mmol/L 9   CALCIUM mg/dL 9 2   ALBUMIN g/dL 3 7   TOTAL BILIRUBIN mg/dL 0 30   ALK PHOS U/L 82   ALT U/L 58   AST U/L 20   GLUCOSE RANDOM mg/dL 102       Results from last 7 days  Lab Units 02/05/19  0122   INR  1 03                   Imaging: I have personally reviewed pertinent reports  XR chest 1 view portable   ED Interpretation by Sonny Canales MD (02/05 2059)   Elevated R hemidiaphragm read by me  EKG, Pathology, and Other Studies Reviewed on Admission:   · EKG: Sinus bradycardia, 1st degree AV block, 48 BPM    Allscripts / Epic Records Reviewed: Yes     ** Please Note: This note has been constructed using a voice recognition system   **

## 2019-02-05 NOTE — ASSESSMENT & PLAN NOTE
· DIANELYS score 1, per ED provider pt needs admission for ACS r/o  · Initial trop negative, EKG nonischemic  · Lipase negative, CMP and CBC wnl  · CXR without cardiopulmonary pathology   · Will monitor on tele  · Trend trop x3  · Serial EKGs  · Last lipid panel Jan 2018, grossly wnl  · Will obtain repeat panel

## 2019-02-05 NOTE — UTILIZATION REVIEW
Initial Clinical Review    Admission: Date/Time/Statement: 2/5/2019  0219 OBSERVATION     Orders Placed This Encounter   Procedures    Place in Observation (expected length of stay for this patient is less than two midnights)     Telemetry     Standing Status:   Standing     Number of Occurrences:   1     Order Specific Question:   Admitting Physician     Answer:   Estefania Mcfarlane     Order Specific Question:   Level of Care     Answer:   Med Surg [16]     Order Specific Question:   Bed request comments     Answer:   telemetry         ED: Date/Time/Mode of Arrival:   ED Arrival Information     Expected Arrival 70 Angela Decker of Arrival Escorted By Service Admission Type    - 2/5/2019 00:52 Urgent Walk-In Family Member Hospitalist Urgent    Arrival Complaint    Chest pain          Chief Complaint:   Chief Complaint   Patient presents with    Chest Pain     Pt reports right sided Chest pain started a half hour ago and radiated down his right arm  Denies N/V/D, reports diaphoresis, piyush Hx of cardiac dx  History of Illness: 61 y o  male with PMHx of HTN who presents with chest pain  Patient states he woke up to the bathroom last night and developed an Aaron right-sided chest pain that radiated down into his right arm  He states this lasts about 45 min and began to resolve upon arrival to the ED without any intervention  He does state he took an aspirin pre-hospital   Reports some improvement with nitro but says his chest pain was already improving at this point  He denies any shortness of breath during it episode of chest pain but does note a brief, mild episode of shortness of breath earlier that day  He does admit to some diaphoresis at that time  He admits to intermittent lower extremity edema that is no worse than usual today  Denies any cough, congestion, fever or chills  Denies any strenuous upper body activity or chest tenderness    Denies any excessive consumption of acidic or spicy foods   Denies headache or dizziness  Denies abdominal pain or diarrhea  Denies urinary symptoms  Patient quit smoking about 20 years ago although he has a 20 year history of daily smoking prior to this  No family history of cardiac disease    ED Vital Signs:   ED Triage Vitals   Temperature Pulse Respirations Blood Pressure SpO2   02/05/19 0107 02/05/19 0105 02/05/19 0105 02/05/19 0105 02/05/19 0105   98 4 °F (36 9 °C) (!) 52 18 157/79 98 %      Temp Source Heart Rate Source Patient Position - Orthostatic VS BP Location FiO2 (%)   02/05/19 0107 02/05/19 0105 02/05/19 0105 02/05/19 0105 --   Oral Monitor Lying Right arm       Pain Score       02/05/19 0105       3        Wt Readings from Last 1 Encounters:   02/05/19 109 kg (240 lb)       Vital Signs (abnormal): sustained bradycardia 52- 50  Heart score 4  DIANELYS score 1    Abnormal Labs/Diagnostic Test Results:   Platelets 655    EKG- sinus bradycardia with 1st degree block  Troponin negative x 2 thus far  0950- troponin 0 05  1229, 1628 and 1958- all negative  ED Treatment:   Medication Administration from 02/05/2019 0052 to 02/05/2019 0350       Date/Time Order Dose Route Action Comments     02/05/2019 0126 nitroglycerin (NITRO-BID) 2 % TD ointment 0 5 inch 0 5 inch Topical Given      02/05/2019 0321 sodium chloride 0 9 % bolus 500 mL 0 mL Intravenous Stopped      02/05/2019 0235 sodium chloride 0 9 % bolus 500 mL 500 mL Intravenous New Bag           Past Medical/Surgical History:   Past Medical History:   Diagnosis Date    Mehta esophagus     Gastric bypass status for obesity     Hypertension        Admitting Diagnosis: Dehydration [E86 0]  Chest pain [R07 9]    Age/Sex: 61 y o  male    Assessment/Plan: This is a 61year old male from home with past medical history of hypertension, gastric bypass and Mehta esophagus who presented to ED with chest pain, earlier in day he had shortness of breath with diaphoresis  He is a former smoker   At home he took ASA, given ntg in ED but pain already resolving at that point  In the ED patient bradycardic to 50s and EKG showed 1st degree block,  DIANELYS score 1  Patient is admitted to observation with chest pain, R/O ACS, plan includes:  Serial troponin, telemetry  On 2/5- last troponin elevated, patient to have stress test and cardiology evaluation, observation continues  Admission Orders:  2/5/2019  0129 OBSERVATION   Scheduled Meds:   Current Facility-Administered Medications:  acetaminophen 650 mg Oral Q4H PRN   allopurinol 300 mg Oral Daily   aspirin 325 mg Oral Daily   atorvastatin 10 mg Oral Daily With Dinner   doxazosin 1 mg Oral Daily   lisinopril 10 mg Oral Daily   metoprolol tartrate 50 mg Oral Q12H BEENA   ondansetron 4 mg Intravenous Q8H PRN     Continuous Infusions:    PRN Meds: not used  OTHER ORDERS:  Telemetry  Serial troponin    Network Utilization Review Department  Phone: [phone]; Fax [phone]  [email or web address]  ATTENTION: Please call with any questions or concerns to [phone]  and carefully listen to the prompts so that you are directed to the right person  Send all requests for admission clinical reviews, approved or denied determinations and any other requests to fax [phone]   All voicemails are confidential

## 2019-02-05 NOTE — ED PROVIDER NOTES
History  Chief Complaint   Patient presents with    Chest Pain     Pt reports right sided Chest pain started a half hour ago and radiated down his right arm  Denies N/V/D, reports diaphoresis, piyush Hx of cardiac dx  Patient is a 61year old male with acute onset of right sided chest pain with radiation to the right arm which woke patient up at midnight tonight  (+) sweating  Not pleuritic  No travel  No N/V  No cough  No sob  Uncles with early MI in their 46s  Took full dose ASA prior to arrival  Ex smoker  Was last seen in this ED on 10/15/18 for diarrhea  [facility] website checked on this patient and last Rx filled was on 8/7/18 for percocet for 2 day supply  History provided by:  Patient and spouse   used: No    Chest Pain   Associated symptoms: diaphoresis    Associated symptoms: no cough, no fever, no nausea, no shortness of breath and not vomiting        Prior to Admission Medications   Prescriptions Last Dose Informant Patient Reported? Taking? ALLOPURINOL PO   Yes Yes   Sig: Take by mouth   Atorvastatin Calcium (LIPITOR PO)   Yes Yes   Sig: Take by mouth   Cimetidine (TAGAMET PO)   Yes Yes   Sig: Take by mouth   Cyanocobalamin (B-12 PO)   Yes Yes   Sig: Take by mouth   DOXAZOSIN MESYLATE PO   Yes Yes   Sig: Take by mouth   LISINOPRIL PO   Yes Yes   Sig: Take by mouth   METOPROLOL TARTRATE PO   Yes Yes   Sig: Take by mouth   Multiple Vitamins-Minerals (MULTIVITAMIN ADULTS PO)   Yes Yes   Sig: Take by mouth   aspirin 325 mg tablet 2/5/2019 at Unknown time  Yes Yes   Sig: Take 325 mg by mouth daily      Facility-Administered Medications: None       Past Medical History:   Diagnosis Date    Mehta esophagus     Gastric bypass status for obesity     Hypertension        Past Surgical History:   Procedure Laterality Date    TOTAL HIP ARTHROPLASTY Right        History reviewed  No pertinent family history  I have reviewed and agree with the history as documented      Social History   Substance Use Topics    Smoking status: Former Smoker    Smokeless tobacco: Never Used    Alcohol use No        Review of Systems   Constitutional: Positive for diaphoresis  Negative for fever  Respiratory: Negative for cough and shortness of breath  Cardiovascular: Positive for chest pain  Gastrointestinal: Negative for nausea and vomiting  Musculoskeletal: Positive for myalgias  All other systems reviewed and are negative  Physical Exam  Physical Exam   Constitutional: He is oriented to person, place, and time  He appears well-developed and well-nourished  He appears distressed (moderate)  HENT:   Head: Normocephalic and atraumatic  Moist mucous membranes  Eyes: No scleral icterus  Neck: No JVD present  No tracheal deviation present  Cardiovascular: Regular rhythm and normal heart sounds  No murmur heard  Bradycardia  Pulmonary/Chest: Effort normal and breath sounds normal  No stridor  No respiratory distress  He has no wheezes  He has no rales  He exhibits no tenderness  Abdominal: Soft  Bowel sounds are normal  He exhibits no distension  There is no tenderness  Musculoskeletal: He exhibits no edema, tenderness (No calf tenderness) or deformity  Neurological: He is alert and oriented to person, place, and time  Skin: Skin is warm and dry  No rash noted  Psychiatric: He has a normal mood and affect  Nursing note and vitals reviewed        Vital Signs  ED Triage Vitals   Temperature Pulse Respirations Blood Pressure SpO2   02/05/19 0107 02/05/19 0105 02/05/19 0105 02/05/19 0105 02/05/19 0105   98 4 °F (36 9 °C) (!) 52 18 157/79 98 %      Temp Source Heart Rate Source Patient Position - Orthostatic VS BP Location FiO2 (%)   02/05/19 0107 02/05/19 0105 02/05/19 0105 02/05/19 0105 --   Oral Monitor Lying Right arm       Pain Score       02/05/19 0105       3           Vitals:    02/05/19 0105 02/05/19 0130 02/05/19 0200   BP: 157/79 137/66 119/66   Pulse: (!) 52 (!) 50 (!) 50   Patient Position - Orthostatic VS: Lying Lying Lying       Visual Acuity      ED Medications  Medications   sodium chloride 0 9 % bolus 500 mL (not administered)   sodium chloride 0 9 % infusion (not administered)   nitroglycerin (NITRO-BID) 2 % TD ointment 0 5 inch (0 5 inches Topical Given 2/5/19 0126)       Diagnostic Studies  Results Reviewed     Procedure Component Value Units Date/Time    Lipase [[id number]]  (Normal) Collected:  02/05/19 0121    Lab Status:  Final result Specimen:  Blood from Arm, Right Updated:  02/05/19 0213     Lipase 237 u/L     CKMB [[id number]]  (Normal) Collected:  02/05/19 0121    Lab Status:  Final result Specimen:  Blood from Arm, Right Updated:  02/05/19 0213     CK-MB Index 2 0 %      CK-MB 3 3 ng/mL     CK Total with Reflex CKMB [[id number]]  (Normal) Collected:  02/05/19 0121    Lab Status:  Final result Specimen:  Blood from Arm, Right Updated:  02/05/19 0208     Total  U/L     Comprehensive metabolic panel [[id number]] Collected:  02/05/19 0121    Lab Status:  Final result Specimen:  Blood from Arm, Right Updated:  02/05/19 0153     Sodium 144 mmol/L      Potassium 4 2 mmol/L      Chloride 106 mmol/L      CO2 29 mmol/L      ANION GAP 9 mmol/L      BUN 23 mg/dL      Creatinine 0 87 mg/dL      Glucose 102 mg/dL      Calcium 9 2 mg/dL      AST 20 U/L      ALT 58 U/L      Alkaline Phosphatase 82 U/L      Total Protein 6 8 g/dL      Albumin 3 7 g/dL      Total Bilirubin 0 30 mg/dL      eGFR 92 ml/min/1 73sq m     Narrative:         National Kidney Disease Education Program recommendations are as follows:  GFR calculation is accurate only with a steady state creatinine  Chronic Kidney disease less than 60 ml/min/1 73 sq  meters  Kidney failure less than 15 ml/min/1 73 sq  meters      Troponin I [[id number]]  (Normal) Collected:  02/05/19 0121    Lab Status:  Final result Specimen:  Blood from Arm, Right Updated:  02/05/19 0152     Troponin I 0 04 ng/mL     Protime-INR [[id number]]  (Normal) Collected:  02/05/19 0122    Lab Status:  Final result Specimen:  Blood from Arm, Right Updated:  02/05/19 0139     Protime 13 2 seconds      INR 1 03    APTT [[id number]]  (Normal) Collected:  02/05/19 0122    Lab Status:  Final result Specimen:  Blood from Arm, Right Updated:  02/05/19 0139     PTT 28 seconds     CBC and differential [[id number]]  (Abnormal) Collected:  02/05/19 0121    Lab Status:  Final result Specimen:  Blood from Arm, Right Updated:  02/05/19 0131     WBC 4 44 Thousand/uL      RBC 4 43 Million/uL      Hemoglobin 13 7 g/dL      Hematocrit 41 5 %      MCV 94 fL      MCH 30 9 pg      MCHC 33 0 g/dL      RDW 12 7 %      MPV 10 3 fL      Platelets 169 (L) Thousands/uL      nRBC 0 /100 WBCs      Neutrophils Relative 48 %      Immat GRANS % 0 %      Lymphocytes Relative 38 %      Monocytes Relative 10 %      Eosinophils Relative 3 %      Basophils Relative 1 %      Neutrophils Absolute 2 16 Thousands/µL      Immature Grans Absolute 0 00 Thousand/uL      Lymphocytes Absolute 1 68 Thousands/µL      Monocytes Absolute 0 43 Thousand/µL      Eosinophils Absolute 0 14 Thousand/µL      Basophils Absolute 0 03 Thousands/µL                  XR chest 1 view portable   ED Interpretation by Tim Fox MD (02/05 0156)   Elevated R hemidiaphragm read by me                    Procedures  ECG 12 Lead Documentation  Date/Time: 2/5/2019 1:18 AM  Performed by: Olive Rodriguez  Authorized by: Olive Rodriguez     Indications / Diagnosis:  Chest pain  ECG reviewed by me, the ED Provider: yes    Patient location:  ED  Previous ECG:     Previous ECG:  Compared to current    Comparison ECG info:  10/15/18    Similarity:  Changes noted (s  fernando now)  Quality:     Tracing quality:  Limited by artifact  Rate:     ECG rate:  48    ECG rate assessment: bradycardic    Rhythm:     Rhythm: sinus bradycardia and A-V block    Ectopy:     Ectopy: none    QRS:     QRS axis:  Left  Conduction: Conduction: abnormal      Abnormal conduction: 1st degree    ST segments:     ST segments:  Normal  T waves:     T waves: normal             Phone Contacts  ED Phone Contact    ED Course  ED Course as of Feb 05 0219   Tue Feb 05, 2019   0124 EKG d/w patient  0214 CXR and labs d/w patient  Patient feeling better  IVFs ordered for dehydration  HEART Risk Score      Most Recent Value   History  2 Filed at: 02/05/2019 0156   ECG  0 Filed at: 02/05/2019 0156   Age  1 Filed at: 02/05/2019 0156   Risk Factors  1 Filed at: 02/05/2019 0156   Troponin  0 Filed at: 02/05/2019 0156   Heart Score Risk Calculator   History  2 Filed at: 02/05/2019 0156   ECG  0 Filed at: 02/05/2019 0156   Age  1 Filed at: 02/05/2019 0156   Risk Factors  1 Filed at: 02/05/2019 0156   Troponin  0 Filed at: 02/05/2019 0156   HEART Score  4 Filed at: 02/05/2019 0156   HEART Score  4 Filed at: 02/05/2019 0156                    DIANELYS Risk Score      Most Recent Value   Age >= 72  0 Filed at: 02/05/2019 0157   Known CAD (stenosis >= 50%)  0 Filed at: 02/05/2019 0157   Recent (<=24 hrs) Service Angina  0 Filed at: 02/05/2019 0157   ST Deviation >= 0 5 mm  0 Filed at: 02/05/2019 0157   3+ CAD Risk Factors (FHx, HTN, HLP, DM, Smoker)  0 Filed at: 02/05/2019 0157   Aspirin Use Past 7 Days  1 Filed at: 02/05/2019 0157   Elevated Cardiac Markers  0 Filed at: 02/05/2019 0157   DIANELYS Risk Score (Calculated)  1 Filed at: 02/05/2019 0157              MDM  Number of Diagnoses or Management Options  Diagnosis management comments: DDX including but not limited to: ACS, MI, PE, PTX, pneumonia, doubt dissection, pleurisy, pericarditis, myocarditis, rhabdomyolysis, GI etiology          Amount and/or Complexity of Data Reviewed  Clinical lab tests: ordered and reviewed  Tests in the radiology section of CPT®: ordered and reviewed  Decide to obtain previous medical records or to obtain history from someone other than the patient: yes  Obtain history from someone other than the patient: yes  Review and summarize past medical records: yes  Independent visualization of images, tracings, or specimens: yes        Disposition  Final diagnoses:   Chest pain   Dehydration     Time reflects when diagnosis was documented in both MDM as applicable and the Disposition within this note     Time User Action Codes Description Comment    2/5/2019  2:18 AM Mickeal [place] Add [R07 9] Chest pain     2/5/2019  2:18 AM Mickeal [place] Add [E86 0] Dehydration       ED Disposition     ED Disposition Condition Date/Time Comment    Admit  Tue Feb 5, 2019  2:18 AM Case was discussed with JERRI Kumar and the patient's admission status was agreed to be Admission Status: observation status to the service of Dr Dago Kelly   Follow-up Information    None         Patient's Medications   Discharge Prescriptions    No medications on file     No discharge procedures on file      ED Provider  Electronically Signed by           Cinthia Lopez MD  02/05/19 1336

## 2019-02-06 ENCOUNTER — APPOINTMENT (OUTPATIENT)
Dept: NON INVASIVE DIAGNOSTICS | Facility: HOSPITAL | Age: 64
End: 2019-02-06
Payer: COMMERCIAL

## 2019-02-06 ENCOUNTER — APPOINTMENT (OUTPATIENT)
Dept: NUCLEAR MEDICINE | Facility: HOSPITAL | Age: 64
End: 2019-02-06
Payer: COMMERCIAL

## 2019-02-06 VITALS
HEART RATE: 62 BPM | HEIGHT: 70 IN | DIASTOLIC BLOOD PRESSURE: 81 MMHG | BODY MASS INDEX: 34.36 KG/M2 | SYSTOLIC BLOOD PRESSURE: 162 MMHG | WEIGHT: 240 LBS | TEMPERATURE: 98.2 F | RESPIRATION RATE: 18 BRPM | OXYGEN SATURATION: 97 %

## 2019-02-06 LAB
CHEST PAIN STATEMENT: NORMAL
MAX DIASTOLIC BP: 68 MMHG
MAX HEART RATE: 97 BPM
MAX PREDICTED HEART RATE: 157 BPM
MAX. SYSTOLIC BP: 128 MMHG
PROTOCOL NAME: NORMAL
REASON FOR TERMINATION: NORMAL
TARGET HR FORMULA: NORMAL
TEST INDICATION: NORMAL
TIME IN EXERCISE PHASE: NORMAL

## 2019-02-06 PROCEDURE — 78452 HT MUSCLE IMAGE SPECT MULT: CPT | Performed by: INTERNAL MEDICINE

## 2019-02-06 PROCEDURE — 99217 PR OBSERVATION CARE DISCHARGE MANAGEMENT: CPT | Performed by: INTERNAL MEDICINE

## 2019-02-06 PROCEDURE — 93016 CV STRESS TEST SUPVJ ONLY: CPT | Performed by: INTERNAL MEDICINE

## 2019-02-06 PROCEDURE — 93017 CV STRESS TEST TRACING ONLY: CPT

## 2019-02-06 PROCEDURE — 93018 CV STRESS TEST I&R ONLY: CPT | Performed by: INTERNAL MEDICINE

## 2019-02-06 PROCEDURE — A9502 TC99M TETROFOSMIN: HCPCS

## 2019-02-06 PROCEDURE — 78452 HT MUSCLE IMAGE SPECT MULT: CPT

## 2019-02-06 RX ADMIN — DOXAZOSIN 1 MG: 1 TABLET ORAL at 08:06

## 2019-02-06 RX ADMIN — ASPIRIN 325 MG: 325 TABLET ORAL at 08:06

## 2019-02-06 RX ADMIN — LISINOPRIL 10 MG: 10 TABLET ORAL at 08:06

## 2019-02-06 RX ADMIN — ALLOPURINOL 300 MG: 300 TABLET ORAL at 08:06

## 2019-02-06 RX ADMIN — METOPROLOL TARTRATE 50 MG: 50 TABLET, FILM COATED ORAL at 08:06

## 2019-02-06 RX ADMIN — REGADENOSON 0.4 MG: 0.08 INJECTION, SOLUTION INTRAVENOUS at 10:14

## 2019-02-06 NOTE — PROGRESS NOTES
Stress lab called to confirm patient is OK for test at [address] [place] [address]  Confirmed  Stated he is allowed to take morning medications  RN listed scheduled medications and stress  said it was OK to give before procedure  Will administer

## 2019-02-06 NOTE — DISCHARGE SUMMARY
Discharge- Hermes Avelar 1955, 61 y o  male MRN: [id number]    Unit/Bed#: -01 Encounter: [id number]    Primary Care Provider: Hina Lanier DO   Date and time admitted to hospital: 2/5/2019 12:58 AM        Essential hypertension   Assessment & Plan    Stable, continue Metoprolol and linsinopril   BP is 133/70  * Chest pain   Assessment & Plan    NM stress test negative  Discussed with cardiology  Okay for DC home  CP resolved - likely musculoskeletal    Outpatient follow up  Discharging Physician / Practitioner: Aviva Flores MD  PCP: Hina Lanier DO  Admission Date:   Admission Orders     Ordered        02/05/19 0219  Place in Observation (expected length of stay for this patient is less than two midnights)  Once             Discharge Date: 02/06/19    Resolved Problems  Date Reviewed: 2/6/2019    None          Consultations During Hospital Stay:  · None  Procedures Performed:   · Stress test -   "   SUMMARY:  -  Stress results: There was no chest pain during stress  -  ECG conclusions: The stress ECG was negative for ischemia and normal   -  Perfusion imaging: There was a moderate-sized, severe, paradoxical (reverse redistribution) myocardial perfusion defect of the entire inferior wall likely due to diaphragm attenuation   -  Gated SPECT: The calculated left ventricular ejection fraction was 62 %  Left ventricular ejection fraction was within normal limits by visual estimate  There was no left ventricular regional abnormality      IMPRESSIONS: Normal study after pharmacologic vasodilation  There was image artifact, without diagnostic evidence for perfusion abnormality  Left ventricular systolic function was normal "     Prepared and signed by     Significant Findings / Test Results:   · None  · Troponin 0 05  Incidental Findings:   · None  Test Results Pending at Discharge (will require follow up): · None  Outpatient Tests Requested:  · None  Complications:    None  Reason for Admission:   Chest pain  Hospital Course:     Ananth Herzog is a 61 y o  male patient who originally presented to the hospital on 2/5/2019 due to chest pain  Patient's chest pain resolved almost immediately after hospitalization however if his 3rd troponin was positive and his 2nd EKG did show some minimal criteria for anterior ischemia  The case was discussed with Cardiology who recommended further inpatient testing in form of a nuclear stress test     This was done and did not show any reversible ischemia  I did discuss the case again with Cardiology regarding the EKG findings in the troponins and they did not recommend any further inpatient testing  After discussion with Cardiology was also felt that an inpatient consult to Cardiology was not warranted at this time  Furthermore the patient's chest pain had entirely resolved at the time of discharge and his vitals were stable  Please see above list of diagnoses and related plan for additional information  Condition at Discharge: stable     Discharge Day Visit / Exam:     Subjective:    Patient seen and examined      " I feel great"  Vitals: Blood Pressure: 133/70 (02/06/19 0806)  Pulse: 74 (02/06/19 0806)  Temperature: 98 4 °F (36 9 °C) (02/06/19 0806)  Temp Source: Oral (02/06/19 0806)  Respirations: 18 (02/05/19 2223)  Height: 5' 10" (177 8 cm) (02/05/19 0333)  Weight - Scale: 109 kg (240 lb) (02/05/19 0333)  SpO2: 99 % (02/06/19 0806)  Exam:   Physical Exam   Constitutional: He is oriented to person, place, and time  He appears well-developed  No distress  HENT:   Head: Normocephalic  Mouth/Throat: No oropharyngeal exudate  Eyes: Right eye exhibits no discharge  Left eye exhibits no discharge  No scleral icterus  Neck: No JVD present  No tracheal deviation present  No thyromegaly present  Cardiovascular: Normal rate  Exam reveals no gallop and no friction rub      No murmur heard   Pulmonary/Chest: Effort normal  No respiratory distress  He has no wheezes  He has no rales  He exhibits no tenderness  Abdominal: Soft  He exhibits no distension and no mass  There is no tenderness  There is no rebound and no guarding  Musculoskeletal: He exhibits no edema, tenderness or deformity  Lymphadenopathy:     He has no cervical adenopathy  Neurological: He is alert and oriented to person, place, and time  He displays normal reflexes  No cranial nerve deficit  He exhibits normal muscle tone  Coordination normal    Skin: Skin is warm  No rash noted  He is not diaphoretic  No erythema  No pallor  Psychiatric: He has a normal mood and affect  Discussion with Family: offered patient to call his wife, he refused and said he would update her himself  Discharge instructions/Information to patient and family:   See after visit summary for information provided to patient and family  Provisions for Follow-Up Care:  See after visit summary for information related to follow-up care and any pertinent home health orders  Disposition:     Home    For Discharges to [facility]:   · Not Applicable to this Patient - Not Applicable to this Patient    Planned Readmission: none  Discharge Statement:  I spent 45 minutes discharging the patient  This time was spent on the day of discharge  I had direct contact with the patient on the day of discharge  Greater than 50% of the total time was spent examining patient, answering all patient questions, arranging and discussing plan of care with patient as well as directly providing post-discharge instructions  Additional time then spent on discharge activities  Discharge Medications:  See after visit summary for reconciled discharge medications provided to patient and family        ** Please Note: This note has been constructed using a voice recognition system **

## 2019-02-06 NOTE — UTILIZATION REVIEW
Please Note this is for an OBSERVATION request at this time    Notification of Observation Care Status/Observation Authorization Request    This is a Notification of Observation Care Status to our facility [address]  Be advised that this patient was admitted to our facility under Observation Status  Please contact the Utilization Review Department where the patient is receiving care services for additional admission information  Place of Service Code: 25  Place of Service Name: [facility]  CPT Code for Observation:     Presentation Date & Time: 2/5/2019 12:58 AM  Observation Admission Date & Time: 02/05/19 0219AM  Hours in Observation: Currently 30 Hrs  Discharge Date & Time: No discharge date for patient encounter  Discharge Disposition (if discharged): Home/Self Care(Disregard, Still in [facility])  Attending Physician: Murphy Luevano, [address] [[id number]]  Admission Orders     Ordered        02/05/19 0219  Place in Observation (expected length of stay for this patient is less than two midnights)  Once               Facility: [place] [address] Utilization Review Department  Phone: [phone]; Fax [phone]  [email or web address]  ATTENTION: Please call with any questions or concerns to [phone]  and carefully listen to the prompts so that you are directed to the right person  Send all requests for admission clinical reviews, approved or denied determinations and any other requests to fax [phone]   All voicemails are confidential

## 2019-02-06 NOTE — ASSESSMENT & PLAN NOTE
NM stress test negative  Discussed with cardiology  Okay for DC home  CP resolved - likely musculoskeletal    Outpatient follow up

## 2019-10-04 ENCOUNTER — EVALUATION (OUTPATIENT)
Dept: PHYSICAL THERAPY | Facility: REHABILITATION | Age: 64
End: 2019-10-04
Payer: COMMERCIAL

## 2019-10-04 ENCOUNTER — TRANSCRIBE ORDERS (OUTPATIENT)
Dept: PHYSICAL THERAPY | Facility: REHABILITATION | Age: 64
End: 2019-10-04

## 2019-10-04 DIAGNOSIS — Z96.642 HISTORY OF TOTAL LEFT HIP REPLACEMENT: Primary | ICD-10-CM

## 2019-10-04 DIAGNOSIS — R26.9 GAIT ABNORMALITY: ICD-10-CM

## 2019-10-04 PROCEDURE — 97161 PT EVAL LOW COMPLEX 20 MIN: CPT | Performed by: PHYSICAL THERAPIST

## 2019-10-04 PROCEDURE — 97110 THERAPEUTIC EXERCISES: CPT | Performed by: PHYSICAL THERAPIST

## 2019-10-04 RX ORDER — TRAMADOL HYDROCHLORIDE 50 MG/1
TABLET ORAL
COMMUNITY

## 2019-10-04 RX ORDER — OXYCODONE HYDROCHLORIDE AND ACETAMINOPHEN 325; 5 MG/5ML; MG/5ML
SOLUTION ORAL
COMMUNITY

## 2019-10-04 NOTE — LETTER
2019    [address] [place] [id number]    Patient: Cindy Kay   YOB: 1955   Date of Visit: 10/4/2019     Encounter Diagnosis     ICD-10-CM    1  History of total left hip replacement Z96 642    2  Gait abnormality R26 9        Dear Dr Kyra Shaffer: Thank you for your recent referral of Cindy Kay  Please review the attached evaluation summary from Benji's recent visit  Please verify that you agree with the plan of care by signing the attached order  If you have any questions or concerns, please do not hesitate to call  I sincerely appreciate the opportunity to share in the care of one of your patients and hope to have another opportunity to work with you in the near future  Sincerely,    Caitie La, PT      Referring Provider:      I certify that I have read the below Plan of Care and certify the need for these services furnished under this plan of treatment while under my care  Aleksandra Michelle Multispan [phone]  VIA Facsimile: [phone]          PT Evaluation     Today's date: 10/4/2019  Patient name: Cindy Kay  : 1955  MRN: [id number]  Referring provider: Flori Ortega  Dx:   Encounter Diagnosis     ICD-10-CM    1  History of total left hip replacement Z96 642    2  Gait abnormality R26 9        Start Time: 0815  Stop Time: 0900  Total time in clinic (min): 45 minutes    Assessment  Assessment details: Cindy Kay is a 61 y o  Male s/p L THR performed 19 anterior approach  Presents with decreased left hip ROM, decreased LE strength, gait abnormalities, and balance deficits, consistent with post-op total hip replacement  These impairments limit patient's ability to perform normal ADLs including getting dressed,  ambulating around the house, and ambulating up/down stairs which are required to get into/out of house   In addition the pt has pain  Pt is an excellent candidate for skilled PT to address these impairments, to reduce pain, and increase function  Patient is in agreement with plan of care and goals for therapy and demonstrates motivation for active participation in proposed plan of care  Therapist explained to pt: findings of IE, rehab diagnosis, and POC  Pt-centered goals reviewed and confirmed by pt  Instruction also given for HEP and hip precautions  Pt expressed verbal agreement and understanding and verified understanding via teach back method  Pt also expressed satisfaction that their current concerns were addressed at the end of the session  Impairments: abnormal gait, abnormal or restricted ROM, activity intolerance, impaired balance, impaired physical strength, lacks appropriate home exercise program, pain with function and weight-bearing intolerance  Barriers to therapy: None   Understanding of Dx/Px/POC: excellent  Goals  Short term goals: to be met in 4 weeks  Pt independent with initial HEP, rationale, technique and frequency, for ROM and pain control  Improve L hip flex, abd to 3/5 of greater for improved ease of transfers and ADLs  Pt will manage 10-15 minutes of continuous activity for general conditioning and endorphin release for pain management using Bike  Pt will be able to stand/walk for at least 30 min without an assistive device, independently without an increase in pain or stiffness to be able to perform work/household duties  Pt will perform the TUG test in less than <14 seconds indicating decreased risk for falls  Pt able to perform 1 sit to stand without UE support to better manage functional transfers      Long term goals: to be met in 8 weeks  Pt will have WFL and pain free L hip ROM which is required for transfers, dressing/bathing, and ambulation  Pt will report a max pain level of 2/10 indicating an overall improvement in hip pain    Pt will be able to asc/desc a flight of stairs step over step, Mod I with minimal to no hip pain to be able to get to and from second floor of house  Pt will improve L hip strength to at least a 4/5 throughout for improved functional mobility and joint protection  Pt able to perform 5 times sit to stand test in <15 sec indicating improved functional strength and decreased risk for falls  Pt independent in final HEP to maintain gains made in therapy  Pt will improve FOTO score to better then expected outcome indicating an overall improvement in pain and function         Plan  Patient would benefit from: skilled physical therapy  Planned modality interventions: TENS, thermotherapy: hydrocollator packs, traction, ultrasound, cryotherapy and low level laser therapy  Planned therapy interventions: body mechanics training, therapeutic training, therapeutic exercise, therapeutic activities, stretching, strengthening, neuromuscular re-education, patient education, home exercise program, functional ROM exercises, flexibility, manual therapy, Verduzco taping, joint mobilization, balance, gait training and balance/weight bearing training  Frequency: 3x week  Duration in weeks: 8  Treatment plan discussed with: patient        Subjective Evaluation    History of Present Illness  Date of surgery: 2019  Mechanism of injury: Pt presents s/p L THR anterior approach performed 19 performed by Dr Danica Riley  Pt has not had any therapy at this time  Ambulating with a RW and SPC  Prior to surgery pt was not using any assistive device  Has hx of R THR post-lateral approach 10 years  Pt reports the following difficulty: walking, putting on socks, stairs, lifting the LLE  Pt able to recall hip precautions  Reports numbness in the L foot this morning and over incision site            Not a recurrent problem   Quality of life: good    Pain  Current pain ratin  At best pain ratin  At worst pain ratin  Location: L low backside, groin  Quality: dull ache  Relieving factors: medications and rest  Aggravating factors: stair climbing and walking  Progression: improved    Social Support  Steps to enter house: yes (5)  Stairs in house: yes   Lives in: multiple-level home  Lives with: spouse and adult children    Employment status: not working (retired)  Hand dominance: right    Treatments  No previous or current treatments  Patient Goals  Patient goals for therapy: decreased pain and independence with ADLs/IADLs  Patient goal: back to normal lifestyle, pain free, walk normally        Objective     Active Range of Motion   Left Hip   Flexion: 90 degrees with pain  External rotation (90/90): 32 degrees with pain  External rotation (prone): 0 degrees     Right Hip   Flexion: 105 degrees   External rotation (90/90): 40 degrees     Strength/Myotome Testing     Left Hip   Planes of Motion   Flexion: 3-  Extension: 2+  Abduction: 2+    Right Hip   Planes of Motion   Flexion: 4+  Extension: 3-  Abduction: 3-    Left Knee   Flexion: 5  Extension: 5    Right Knee   Flexion: 5  Extension: 5    Ambulation     Comments   TUG test = 26 sec with RW, 25 sec with SPC    5 times sit to stand= 32 sec with UE use (R lateral lean)    Ambulates with RW or SPC, dec hip extension, increased UE support, dec stance on LLE  General Comments:      Hip Comments   Incision site is dry and intact with dressing in place, some bruising, no evidence or DVT or infection  LLE with mild edema, but no warmth  Reviewed signs of infection and DVT with pt        Flowsheet Rows      Most Recent Value   PT/OT G-Codes   Current Score  25   Projected Score  57             Precautions: HTN, L THR anterior approach, hx of R THR    FOTO  10/4 = 25/57    Manual  10/4            LLE hamstring stretch, gastroc stretch, gentle ER stretch                                                                     Exercise Diary  10/4            Bike Next visit            Mini squats 2x10            Standing hip abd 2x10 each            Sit to stands HEP Heel raises x20            amb with march, butt kick, side step, heel/toe Next visit            Slant board Next visit            Weight shifting vs tandem balance Next visit            Ham stretch with strap              Balance -Feet together on foam Next visit            Fwd step up  Next visit           Lateral step up  Next visit           Step downs   Next visit                                                                                                         Modalities

## 2019-10-04 NOTE — PROGRESS NOTES
PT Evaluation     Today's date: 10/4/2019  Patient name: Cari Gates  : 1955  MRN: [id number]  Referring provider: Dave Kwok  Dx:   Encounter Diagnosis     ICD-10-CM    1  History of total left hip replacement Z96 642    2  Gait abnormality R26 9        Start Time: 0815  Stop Time: 0900  Total time in clinic (min): 45 minutes    Assessment  Assessment details: Cari Gates is a 61 y o  Male s/p L THR performed 19 anterior approach  Presents with decreased left hip ROM, decreased LE strength, gait abnormalities, and balance deficits, consistent with post-op total hip replacement  These impairments limit patient's ability to perform normal ADLs including getting dressed,  ambulating around the house, and ambulating up/down stairs which are required to get into/out of house  In addition the pt has pain  Pt is an excellent candidate for skilled PT to address these impairments, to reduce pain, and increase function  Patient is in agreement with plan of care and goals for therapy and demonstrates motivation for active participation in proposed plan of care  Therapist explained to pt: findings of IE, rehab diagnosis, and POC  Pt-centered goals reviewed and confirmed by pt  Instruction also given for HEP and hip precautions  Pt expressed verbal agreement and understanding and verified understanding via teach back method  Pt also expressed satisfaction that their current concerns were addressed at the end of the session  Impairments: abnormal gait, abnormal or restricted ROM, activity intolerance, impaired balance, impaired physical strength, lacks appropriate home exercise program, pain with function and weight-bearing intolerance  Barriers to therapy: None   Understanding of Dx/Px/POC: excellent  Goals  Short term goals: to be met in 4 weeks  Pt independent with initial HEP, rationale, technique and frequency, for ROM and pain control    Improve L hip flex, abd to 3/5 of greater for improved ease of transfers and ADLs  Pt will manage 10-15 minutes of continuous activity for general conditioning and endorphin release for pain management using Bike  Pt will be able to stand/walk for at least 30 min without an assistive device, independently without an increase in pain or stiffness to be able to perform work/household duties  Pt will perform the TUG test in less than <14 seconds indicating decreased risk for falls  Pt able to perform 1 sit to stand without UE support to better manage functional transfers      Long term goals: to be met in 8 weeks  Pt will have WFL and pain free L hip ROM which is required for transfers, dressing/bathing, and ambulation  Pt will report a max pain level of 2/10 indicating an overall improvement in hip pain  Pt will be able to asc/desc a flight of stairs step over step, Mod I with minimal to no hip pain to be able to get to and from second floor of house  Pt will improve L hip strength to at least a 4/5 throughout for improved functional mobility and joint protection  Pt able to perform 5 times sit to stand test in <15 sec indicating improved functional strength and decreased risk for falls    Pt independent in final HEP to maintain gains made in therapy  Pt will improve FOTO score to better then expected outcome indicating an overall improvement in pain and function         Plan  Patient would benefit from: skilled physical therapy  Planned modality interventions: TENS, thermotherapy: hydrocollator packs, traction, ultrasound, cryotherapy and low level laser therapy  Planned therapy interventions: body mechanics training, therapeutic training, therapeutic exercise, therapeutic activities, stretching, strengthening, neuromuscular re-education, patient education, home exercise program, functional ROM exercises, flexibility, manual therapy, Verduzco taping, joint mobilization, balance, gait training and balance/weight bearing training  Frequency: 3x week  Duration in weeks: 8  Treatment plan discussed with: patient        Subjective Evaluation    History of Present Illness  Date of surgery: 2019  Mechanism of injury: Pt presents s/p L THR anterior approach performed 19 performed by Dr Sunshine Pierce  Pt has not had any therapy at this time  Ambulating with a RW and SPC  Prior to surgery pt was not using any assistive device  Has hx of R THR post-lateral approach 10 years  Pt reports the following difficulty: walking, putting on socks, stairs, lifting the LLE  Pt able to recall hip precautions  Reports numbness in the L foot this morning and over incision site            Not a recurrent problem   Quality of life: good    Pain  Current pain ratin  At best pain ratin  At worst pain ratin  Location: L low backside, groin  Quality: dull ache  Relieving factors: medications and rest  Aggravating factors: stair climbing and walking  Progression: improved    Social Support  Steps to enter house: yes (5)  Stairs in house: yes   Lives in: multiple-level home  Lives with: spouse and adult children    Employment status: not working (retired)  Hand dominance: right    Treatments  No previous or current treatments  Patient Goals  Patient goals for therapy: decreased pain and independence with ADLs/IADLs  Patient goal: back to normal lifestyle, pain free, walk normally        Objective     Active Range of Motion   Left Hip   Flexion: 90 degrees with pain  External rotation (90/90): 32 degrees with pain  External rotation (prone): 0 degrees     Right Hip   Flexion: 105 degrees   External rotation (90/90): 40 degrees     Strength/Myotome Testing     Left Hip   Planes of Motion   Flexion: 3-  Extension: 2+  Abduction: 2+    Right Hip   Planes of Motion   Flexion: 4+  Extension: 3-  Abduction: 3-    Left Knee   Flexion: 5  Extension: 5    Right Knee   Flexion: 5  Extension: 5    Ambulation     Comments   TUG test = 26 sec with RW, 25 sec with SPC    5 times sit to stand= 32 sec with UE use (R lateral lean)    Ambulates with RW or SPC, dec hip extension, increased UE support, dec stance on LLE  General Comments:      Hip Comments   Incision site is dry and intact with dressing in place, some bruising, no evidence or DVT or infection  LLE with mild edema, but no warmth  Reviewed signs of infection and DVT with pt        Flowsheet Rows      Most Recent Value   PT/OT G-Codes   Current Score  25   Projected Score  57             Precautions: HTN, L THR anterior approach, hx of R THR    FOTO  10/4 = 25/57    Manual  10/4            LLE hamstring stretch, gastroc stretch, gentle ER stretch                                                                     Exercise Diary  10/4            Bike Next visit            Mini squats 2x10            Standing hip abd 2x10 each            Sit to stands HEP            Heel raises x20            amb with march, butt kick, side step, heel/toe Next visit            Slant board Next visit            Weight shifting vs tandem balance Next visit            Ham stretch with strap              Balance -Feet together on foam Next visit            Fwd step up  Next visit           Lateral step up  Next visit           Step downs   Next visit                                                                                                         Modalities

## 2019-10-07 ENCOUNTER — OFFICE VISIT (OUTPATIENT)
Dept: PHYSICAL THERAPY | Facility: REHABILITATION | Age: 64
End: 2019-10-07
Payer: COMMERCIAL

## 2019-10-07 DIAGNOSIS — R26.9 GAIT ABNORMALITY: ICD-10-CM

## 2019-10-07 DIAGNOSIS — Z96.642 HISTORY OF TOTAL LEFT HIP REPLACEMENT: Primary | ICD-10-CM

## 2019-10-07 PROCEDURE — 97110 THERAPEUTIC EXERCISES: CPT

## 2019-10-07 PROCEDURE — 97140 MANUAL THERAPY 1/> REGIONS: CPT

## 2019-10-07 PROCEDURE — 97112 NEUROMUSCULAR REEDUCATION: CPT

## 2019-10-07 NOTE — PROGRESS NOTES
Daily Note     Today's date: 10/7/2019  Patient name: Dani Escobar  : 1955  MRN: [id number]  Referring provider: JERRI Aguilar*  Dx:   Encounter Diagnosis     ICD-10-CM    1  History of total left hip replacement Z96 642    2  Gait abnormality R26 9        Start Time:   Stop Time:   Total time in clinic (min): 45 minutes    Subjective: Pt reports that he was sore from his HEP  Is currently sleeping on recliner and has difficulty lifting up his hip into the bed  Objective: See treatment diary below      Assessment: Tolerated treatment well  Pt had no c/o pain during PROM within protocol  Patient demonstrated fatigue post treatment, fatigues with standing interventions  Plan: Continue per plan of care        Precautions: HTN, L THR anterior approach, hx of R THR    FOTO  10/4 =     Manual  10/4 10/07           LLE hamstring stretch, gastroc stretch, gentle ER stretch  LNK                                                                   Exercise Diary  10/4 10/07           Bike Next visit 8'           Mini squats 2x10 3x10           Standing hip abd 2x10 each 3x10            Sit to stands HEP            Heel raises x20 x30           amb with march, butt kick, side step, heel/toe Next visit 10' x2 march & butt kick           Slant board Next visit 30"x3           Weight shifting vs tandem balance Next visit WS  10x10"           Ham stretch with strap              Balance -Feet together on foam Next visit 20"x3 with UE           Fwd step up  Next visit           Lateral step up  Next visit           Step downs   Next visit                                                                                                         Modalities

## 2019-10-11 ENCOUNTER — OFFICE VISIT (OUTPATIENT)
Dept: PHYSICAL THERAPY | Facility: REHABILITATION | Age: 64
End: 2019-10-11
Payer: COMMERCIAL

## 2019-10-11 DIAGNOSIS — R26.9 GAIT ABNORMALITY: ICD-10-CM

## 2019-10-11 DIAGNOSIS — Z96.642 HISTORY OF TOTAL LEFT HIP REPLACEMENT: Primary | ICD-10-CM

## 2019-10-11 PROCEDURE — 97110 THERAPEUTIC EXERCISES: CPT | Performed by: PHYSICAL THERAPIST

## 2019-10-11 PROCEDURE — 97112 NEUROMUSCULAR REEDUCATION: CPT | Performed by: PHYSICAL THERAPIST

## 2019-10-11 NOTE — PROGRESS NOTES
Daily Note     Today's date: 10/11/2019  Patient name: Maria Esther Wesley  : 1955  MRN: [id number]  Referring provider: JERRI Guerrero*  Dx:   Encounter Diagnosis     ICD-10-CM    1  History of total left hip replacement Z96 642    2  Gait abnormality R26 9        Start Time: 1317  Stop Time: 1402  Total time in clinic (min): 45 minutes    Subjective: Pt states his pain is about a 5/10 today and states the low back has been bothersome as well  Pain post tx = 3/10      Objective: See treatment diary below      Assessment: Pt responded well to tx today and reported less pain at the end of the session  Increased time required for functional transfers  Added bridges to address hip extension strength impairments  Pt continues to ambulate with SPC and antalgic gait  Pt will benefit from continued skilled outpatient PT to improve strength, to address therapy goals, to reduce pain, and improve function  Plan: Continue per plan of care        Precautions: HTN, L THR anterior approach, hx of R THR    FOTO  10/4 = 25/57    Manual  10/4 10/07 10/11          LLE hamstring stretch, gastroc stretch, gentle ER stretch  LNK DS - hams, ER, quad, adductor                                                                  Exercise Diary  10/4 10/07 10/11          Bike Next visit 8' 8'          Mini squats 2x10 3x10 HEP          Standing hip abd 2x10 each 3x10  HEP          Sit to stands HEP            Heel raises x20 x30 HEP          amb with march, butt kick, side step, heel/toe Next visit 10' x2 march & butt kick 25'x1 each with SPC          Slant board Next visit 30"x3 30"x4          Weight shifting vs tandem balance Next visit WS  10x10" Wt shift x2 min          Ham stretch with strap              Balance -Feet together on foam Next visit 20"x3 with UE Next visit          Fwd step up  Next visit NV          Lateral step up  Next visit NV          Step downs   Next visit          Bridges   3" 2x10 Modalities

## 2019-10-14 ENCOUNTER — OFFICE VISIT (OUTPATIENT)
Dept: PHYSICAL THERAPY | Facility: REHABILITATION | Age: 64
End: 2019-10-14
Payer: COMMERCIAL

## 2019-10-14 DIAGNOSIS — Z96.642 HISTORY OF TOTAL LEFT HIP REPLACEMENT: Primary | ICD-10-CM

## 2019-10-14 DIAGNOSIS — R26.9 GAIT ABNORMALITY: ICD-10-CM

## 2019-10-14 PROCEDURE — 97112 NEUROMUSCULAR REEDUCATION: CPT | Performed by: PHYSICAL THERAPIST

## 2019-10-14 PROCEDURE — 97140 MANUAL THERAPY 1/> REGIONS: CPT | Performed by: PHYSICAL THERAPIST

## 2019-10-14 PROCEDURE — 97110 THERAPEUTIC EXERCISES: CPT | Performed by: PHYSICAL THERAPIST

## 2019-10-14 NOTE — PROGRESS NOTES
Daily Note     Today's date: 10/14/2019  Patient name: Miguel Osorio  : 1955  MRN: [id number]  Referring provider: JERRI Pereyra*  Dx:   Encounter Diagnosis     ICD-10-CM    1  History of total left hip replacement Z96 642    2  Gait abnormality R26 9        Start Time: 0845  Stop Time: 5982  Total time in clinic (min): 54 minutes    Subjective: Pt states he is a little sore but not bad  Objective: See treatment diary below      Assessment: Pt continues to require PT assistance to lift the LLE when transferring from standing to supine on plinth  Added forward and lateral step ups today  Pt fearful of performing with 6inch step therefore started with 4 inch step  Pt demonstrated increased UE WBing during step ups due to LE weakness  Pt will benefit from continued skilled outpatient PT to improve strength, to address therapy goals, to reduce pain, and improve function  Plan: Continue per plan of care        Precautions: HTN, L THR anterior approach, hx of R THR    FOTO  10/4 = 25/57    Manual  10/4 10/07 10/11 10/14         LLE hamstring stretch, gastroc stretch, gentle ER stretch  LNK DS - hams, ER, quad, adductor DS - hams, ER, quad, adductor                                                                 Exercise Diary  10/4 10/07 10/11 10/14         Bike Next visit 8' 8' L3 10'         Mini squats 2x10 3x10 HEP          Standing hip abd 2x10 each 3x10  HEP          Sit to stands HEP            Heel raises x20 x30 HEP          amb with march, butt kick, side step, heel/toe Next visit 10' x2 march & butt kick 25'x1 each with SPC 45'x1 each with SPC         Slant board Next visit 30"x3 30"x4 30"x4         Weight shifting vs tandem balance Next visit WS  10x10" Wt shift x2 min          Ham stretch with strap              Balance -Feet together on foam Next visit 20"x3 with UE Next visit 30"x3 no UE         Fwd step up  Next visit NV 4" x15 1UE         Lateral step up  Next visit NV 4" x15 BUE         Step downs      Next visit       Bridges   3" 2x10 3" 3x10                                                                                           Modalities

## 2019-10-17 ENCOUNTER — OFFICE VISIT (OUTPATIENT)
Dept: PHYSICAL THERAPY | Facility: REHABILITATION | Age: 64
End: 2019-10-17
Payer: COMMERCIAL

## 2019-10-17 DIAGNOSIS — Z96.642 HISTORY OF TOTAL LEFT HIP REPLACEMENT: Primary | ICD-10-CM

## 2019-10-17 DIAGNOSIS — R26.9 GAIT ABNORMALITY: ICD-10-CM

## 2019-10-17 PROCEDURE — 97112 NEUROMUSCULAR REEDUCATION: CPT | Performed by: PHYSICAL THERAPIST

## 2019-10-17 PROCEDURE — 97110 THERAPEUTIC EXERCISES: CPT | Performed by: PHYSICAL THERAPIST

## 2019-10-17 NOTE — PROGRESS NOTES
Daily Note     Today's date: 10/17/2019  Patient name: Valery Short  : 1955  MRN: [id number]  Referring provider: JERRI Allen*  Dx:   Encounter Diagnosis     ICD-10-CM    1  History of total left hip replacement Z96 642    2  Gait abnormality R26 9        Start Time: 8108  Stop Time: 3170  Total time in clinic (min): 57 minutes    Subjective: Pt reports he tried his recumbent bike at home and it was a little more difficult than the bike in therapy due to having to lift his leg up and over the middle of the bike higher than the bike in therapy, but states otherwise it went well  Objective: See treatment diary below      Assessment: Pt demonstrates improving LE strength  He requires less assistance to lift the LLE onto mat with standing to supine transfers and is now able to roll to/from supine without PT assistance  Pt was initially hesitant to perform wall squats, but was able to perform with good form and close to equal WBing  Pt demonstrated minimal sway during balance exercises  Instructed pt to D/C use of cane at home as pt is now able to walk with good mechanics and no evidence of LOB  Instructed him to continue use of cane for longer distances and outside the house  Pt will benefit from continued skilled outpatient PT to improve strength, to address therapy goals, to reduce pain, and improve function  Plan: Continue per plan of care        Precautions: HTN, L THR anterior approach, hx of R THR    FOTO  10/4 =     Manual  10/4 10/07 10/11 10/14 10/17        LLE hamstring stretch, gastroc stretch, gentle ER stretch  LNK DS - hams, ER, quad, adductor DS - hams, ER, quad, adductor DS - hams, ER, quad, adductor                                                                Exercise Diary  10/4 10/07 10/11 10/14 10/17        Bike Next visit 8' 8' L3 10' L3 10'        Mini squats 2x10 3x10 HEP          Standing hip abd 2x10 each 3x10  HEP          Sit to stands HEP            Heel raises x20 x30 HEP          amb with march, butt kick, side step, heel/toe Next visit 10' x2 march & butt kick 25'x1 each with SPC 45'x1 each with SPC 45'x1 each with SPC        Slant board Next visit 30"x3 30"x4 30"x4 30"x4        Weight shifting vs tandem balance Next visit WS  10x10" Wt shift x2 min  tandem 30"x2 each Single leg       Ham stretch with strap              Balance -Feet45'x1 each with SPC together on foam Next visit 20"x3 with UE Next visit 30"x3 no UE airex 30"x3 no UE tandem on airex       Fwd step up  Next visit NV 4" x15 1UE 4" x20 1UE        Lateral step up  Next visit NV 4" x15 BUE 4" x20 1UE        Step downs      Next visit       Bridges   3" 2x10 3" 3x10 3" 3x10                                                                                          Modalities

## 2019-10-21 ENCOUNTER — OFFICE VISIT (OUTPATIENT)
Dept: PHYSICAL THERAPY | Facility: REHABILITATION | Age: 64
End: 2019-10-21
Payer: COMMERCIAL

## 2019-10-21 DIAGNOSIS — Z96.642 HISTORY OF TOTAL LEFT HIP REPLACEMENT: Primary | ICD-10-CM

## 2019-10-21 DIAGNOSIS — R26.9 GAIT ABNORMALITY: ICD-10-CM

## 2019-10-21 PROCEDURE — 97110 THERAPEUTIC EXERCISES: CPT | Performed by: PHYSICAL THERAPIST

## 2019-10-21 PROCEDURE — 97112 NEUROMUSCULAR REEDUCATION: CPT | Performed by: PHYSICAL THERAPIST

## 2019-10-21 NOTE — PROGRESS NOTES
Daily Note     Today's date: 10/21/2019  Patient name: Lee Gutiérrez  : 1955  MRN: [id number]  Referring provider: JERRI Miller*  Dx:   Encounter Diagnosis     ICD-10-CM    1  History of total left hip replacement Z96 642    2  Gait abnormality R26 9        Start Time: 0845  Stop Time: 397  Total time in clinic (min): 46 minutes    Subjective: Pt states he fogot his cane today  States he has been getting more confident and went down the stairs the other day "normal" and had more soreness and pain later    Objective: See treatment diary below    Assessment: Pt completed functional gait exercises and step ups without cane today and progressed tandem balance to single leg balance  Trialed 6" step, but pt unable to perform without hip soreness, therefore stayed at 4" step  Pt will benefit from continued skilled outpatient PT to improve strength, to address therapy goals, to reduce pain, and improve function  Plan: Continue per plan of care        Precautions: HTN, L THR anterior approach, hx of R THR    FOTO  10/4 = 25/57  10/21 = 46/57    Manual  10/4 10/07 10/11 10/14 10/17 10/21       LLE hamstring stretch, gastroc stretch, gentle ER stretch  LNK DS - hams, ER, quad, adductor DS - hams, ER, quad, adductor DS - hams, ER, quad, adductor DS - hams, ER, quad                                                               Exercise Diary  10/4 10/07 10/11 10/14 10/17 10/21       Bike Next visit 8' 8' L3 10' L3 10' L3 10'       Mini squats 2x10 3x10 HEP          Standing hip abd 2x10 each 3x10  HEP          Sit to stands HEP            Heel raises x20 x30 HEP          amb with march, butt kick, side step, heel/toe Next visit 10' x2 march & butt kick 25'x1 each with SPC 45'x1 each with SPC 45'x1 each with SPC 45'x1 each, no cane       Slant board Next visit 30"x3 30"x4 30"x4 30"x4 30"x4       Weight shifting vs tandem balance Next visit WS  10x10" Wt shift x2 min  tandem 30"x2 each Single leg 30"x6 Ham stretch with strap              Balance -Feet45'x1 each with SPC together on foam Next visit 20"x3 with UE Next visit 30"x3 no UE airex 30"x3 no UE tandem on airex - next visit       Fwd step up  Next visit NV 4" x15 1UE 4" x20 1UE 4" x20, no UE       Lateral step up  Next visit NV 4" x15 BUE 4" x20 1UE 4" x20 no UE       Step downs      Next visit       Bridges   3" 2x10 3" 3x10 3" 3x10                                                                                          Modalities

## 2019-10-24 ENCOUNTER — OFFICE VISIT (OUTPATIENT)
Dept: PHYSICAL THERAPY | Facility: REHABILITATION | Age: 64
End: 2019-10-24
Payer: COMMERCIAL

## 2019-10-24 DIAGNOSIS — R26.9 GAIT ABNORMALITY: ICD-10-CM

## 2019-10-24 DIAGNOSIS — Z96.642 HISTORY OF TOTAL LEFT HIP REPLACEMENT: Primary | ICD-10-CM

## 2019-10-24 PROCEDURE — 97110 THERAPEUTIC EXERCISES: CPT | Performed by: PHYSICAL THERAPIST

## 2019-10-24 PROCEDURE — 97112 NEUROMUSCULAR REEDUCATION: CPT | Performed by: PHYSICAL THERAPIST

## 2019-10-24 NOTE — PROGRESS NOTES
Daily Note     Today's date: 10/24/2019  Patient name: Jesus Hamm  : 1955  MRN: [id number]  Referring provider: JERRI Chu*  Dx:   Encounter Diagnosis     ICD-10-CM    1  History of total left hip replacement Z96 642    2  Gait abnormality R26 9        Start Time: 0845  Stop Time: 0930  Total time in clinic (min): 45 minutes    Subjective: Pt states his pain is better today  Objective: See treatment diary below    Assessment: Pt was challenged by addition of step downs  He continues to be challenged by balance exercises and requires occasional UE support  Pt will benefit from continued skilled outpatient PT to improve strength, to address therapy goals, to reduce pain, and improve function  Plan: Continue per plan of care        Precautions: HTN, L THR anterior approach, hx of R THR    FOTO  10/4 = 25/57  10/21 = 46/57    Manual  10/4 10/07 10/11 10/14 10/17 10/21 10/24      LLE hamstring stretch, gastroc stretch, gentle ER stretch  LNK DS - hams, ER, quad, adductor DS - hams, ER, quad, adductor DS - hams, ER, quad, adductor DS - hams, ER, quad DS - hams, ER, quad                                                              Exercise Diary  10/4 10/07 10/11 10/14 10/17 10/21 10/24      Bike Next visit 8' 8' L3 10' L3 10' L3 10' L3 8'      Mini squats 2x10 3x10 HEP          Standing hip abd 2x10 each 3x10  HEP          Sit to stands HEP            Heel raises x20 x30 HEP          amb with march, butt kick, side step, heel/toe Next visit 10' x2 march & butt kick 25'x1 each with SPC 45'x1 each with SPC 45'x1 each with SPC 45'x1 each, no cane 45'x1 each, no cane      Slant board Next visit 30"x3 30"x4 30"x4 30"x4 30"x4       Weight shifting vs tandem balance Next visit WS  10x10" Wt shift x2 min  tandem 30"x2 each Single leg 30"x6 Single leg 30"x6      Ham stretch with strap              Balance -Feet45'x1 each with SPC together on foam Next visit 20"x3 with UE Next visit 30"x3 no UE airex 30"x3 no UE tandem on airex - next visit tandem 30"x4 each      Fwd step up  Next visit NV 4" x15 1UE 4" x20 1UE 4" x20, no UE 4" x20 no UE      Lateral step up  Next visit NV 4" x15 BUE 4" x20 1UE 4" x20 no UE 4" x20 no UE      Step downs      Next visit 4" x20      Bridges   3" 2x10 3" 3x10 3" 3x10                                                                                          Modalities

## 2019-10-29 ENCOUNTER — OFFICE VISIT (OUTPATIENT)
Dept: PHYSICAL THERAPY | Facility: REHABILITATION | Age: 64
End: 2019-10-29
Payer: COMMERCIAL

## 2019-10-29 DIAGNOSIS — R26.9 GAIT ABNORMALITY: ICD-10-CM

## 2019-10-29 DIAGNOSIS — Z96.642 HISTORY OF TOTAL LEFT HIP REPLACEMENT: Primary | ICD-10-CM

## 2019-10-29 PROCEDURE — 97110 THERAPEUTIC EXERCISES: CPT | Performed by: PHYSICAL THERAPIST

## 2019-10-29 PROCEDURE — 97112 NEUROMUSCULAR REEDUCATION: CPT | Performed by: PHYSICAL THERAPIST

## 2019-10-29 NOTE — PROGRESS NOTES
Daily Note     Today's date: 10/29/2019  Patient name: Jamal Hammonds  : 1955  MRN: [id number]  Referring provider: JERRI Tristan*  Dx:   Encounter Diagnosis     ICD-10-CM    1  History of total left hip replacement Z96 642    2  Gait abnormality R26 9        Start Time: 0850  Stop Time: 0925  Total time in clinic (min): 35 minutes    Subjective: Pt states he got in the car without his cane today  Objective: See treatment diary below    Assessment: Pt fatigues quickly with step up/downs  He is progressing well, but still unable to progress to 6" step  Pt will benefit from continued skilled outpatient PT to improve strength, to address therapy goals, to reduce pain, and improve function  Plan: Continue per plan of care        Precautions: HTN, L THR anterior approach, hx of R THR    FOTO  10/4 = 25/57  10/21 = 46/57    Manual  10/4 10/07 10/11 10/14 10/17 10/21 10/24 10/29     LLE hamstring stretch, gastroc stretch, gentle ER stretch  LNK DS - hams, ER, quad, adductor DS - hams, ER, quad, adductor DS - hams, ER, quad, adductor DS - hams, ER, quad DS - hams, ER, quad DS - hams, ER, quad                                                             Exercise Diary  10/4 10/07 10/11 10/14 10/17 10/21 10/24 10/29     Bike Next visit 8' 8' L3 10' L3 10' L3 10' L3 8' L4 8'     Mini squats 2x10 3x10 HEP          Standing hip abd 2x10 each 3x10  HEP          Sit to stands HEP            Heel raises x20 x30 HEP          amb with march, butt kick, side step, heel/toe Next visit 10' x2 march & butt kick 25'x1 each with SPC 45'x1 each with SPC 45'x1 each with SPC 45'x1 each, no cane 45'x1 each, no cane tandem only x100' no cane     Slant board Next visit 30"x3 30"x4 30"x4 30"x4 30"x4       Weight shifting vs tandem balance Next visit WS  10x10" Wt shift x2 min  tandem 30"x2 each Single leg 30"x6 Single leg 30"x6 Single leg 30"x6     Ham stretch with strap              Balance -Feet 45'x1 each with SPC together on foam Next visit 20"x3 with UE Next visit 30"x3 no UE airex 30"x3 no UE tandem on airex - next visit tandem 30"x4 each tandem 30"x4 each     Fwd step up  Next visit NV 4" x15 1UE 4" x20 1UE 4" x20, no UE 4" x20 no UE 4" x20 no UE     Lateral step up  Next visit NV 4" x15 BUE 4" x20 1UE 4" x20 no UE 4" x20 no UE 4" x20 no UE     Step downs      Next visit 4" x20 4" x20   1UE     Bridges   3" 2x10 3" 3x10 3" 3x10        S/l hip abd        1x12                                                                          Modalities

## 2019-10-31 ENCOUNTER — OFFICE VISIT (OUTPATIENT)
Dept: PHYSICAL THERAPY | Facility: REHABILITATION | Age: 64
End: 2019-10-31
Payer: COMMERCIAL

## 2019-10-31 DIAGNOSIS — Z96.642 HISTORY OF TOTAL LEFT HIP REPLACEMENT: Primary | ICD-10-CM

## 2019-10-31 DIAGNOSIS — R26.9 GAIT ABNORMALITY: ICD-10-CM

## 2019-10-31 PROCEDURE — 97112 NEUROMUSCULAR REEDUCATION: CPT | Performed by: PHYSICAL THERAPIST

## 2019-10-31 PROCEDURE — 97110 THERAPEUTIC EXERCISES: CPT | Performed by: PHYSICAL THERAPIST

## 2019-10-31 NOTE — PROGRESS NOTES
Daily Note     Today's date: 10/31/2019  Patient name: Chiquis Massey  : 1955  MRN: [id number]  Referring provider: JERRI Reed*  Dx:   Encounter Diagnosis     ICD-10-CM    1  History of total left hip replacement Z96 642    2  Gait abnormality R26 9        Start Time: 0845  Stop Time: 09  Total time in clinic (min): 45 minutes    Subjective: Pt states he went to the gym yesterday and did some upper body exercises and it went well  Objective: See treatment diary below    Assessment: Pt did well with progression of single leg and tandem balance to airex, but required occasional UE support to prevent LOB  He did well with addition of the leg press  Pt will benefit from continued skilled outpatient PT to improve strength, to address therapy goals, to reduce pain, and improve function  Plan: Continue per plan of care        Precautions: HTN, L THR anterior approach, hx of R THR    FOTO  10/4 = 25/57  10/21 = 46/57    Manual  10/4 10/07 10/11 10/14 10/17 10/21 10/24 10/29 10/31    LLE hamstring stretch, gastroc stretch, gentle ER stretch  LNK DS - hams, ER, quad, adductor DS - hams, ER, quad, adductor DS - hams, ER, quad, adductor DS - hams, ER, quad DS - hams, ER, quad DS - hams, ER, quad DS - hams, ER, quad                                                            Exercise Diary  10/4 10/07 10/11 10/14 10/17 10/21 10/24 10/29 10/31    Bike Next visit 8' 8' L3 10' L3 10' L3 10' L3 8' L4 8' L4 8'    Mini squats 2x10 3x10 HEP          Standing hip abd 2x10 each 3x10  HEP          Sit to stands HEP            Heel raises x20 x30 HEP          amb with march, butt kick, side step, heel/toe Next visit 10' x2 march & butt kick 25'x1 each with SPC 45'x1 each with SPC 45'x1 each with SPC 45'x1 each, no cane 45'x1 each, no cane tandem only x100' no cane tandem only x100' no cane    Slant board Next visit 30"x3 30"x4 30"x4 30"x4 30"x4       Weight shifting vs tandem balance Next visit WS  10x10" Wt shift x2 min  tandem 30"x2 each Single leg 30"x6 Single leg 30"x6 Single leg 30"x6 SLS on airex 30"x4    Ham stretch with strap              Balance -Feet 45'x1 each with SPC together on foam Next visit 20"x3 with UE Next visit 30"x3 no UE airex 30"x3 no UE tandem on airex - next visit tandem 30"x4 each tandem 30"x4 each tandem on airex 30"x4    Fwd step up  Next visit NV 4" x15 1UE 4" x20 1UE 4" x20, no UE 4" x20 no UE 4" x20 no UE 4" x20 no UE    Lateral step up  Next visit NV 4" x15 BUE 4" x20 1UE 4" x20 no UE 4" x20 no UE 4" x20 no UE 4" x20 no UE    Step downs      Next visit 4" x20 4" x20   1UE 4" x20 1UE    Bridges   3" 2x10 3" 3x10 3" 3x10        S/l hip abd        1x12 2x10    Leg press         120# 2x10                                                            Modalities

## 2019-11-04 ENCOUNTER — OFFICE VISIT (OUTPATIENT)
Dept: PHYSICAL THERAPY | Facility: REHABILITATION | Age: 64
End: 2019-11-04
Payer: COMMERCIAL

## 2019-11-04 DIAGNOSIS — Z96.642 HISTORY OF TOTAL LEFT HIP REPLACEMENT: Primary | ICD-10-CM

## 2019-11-04 DIAGNOSIS — R26.9 GAIT ABNORMALITY: ICD-10-CM

## 2019-11-04 PROCEDURE — 97112 NEUROMUSCULAR REEDUCATION: CPT | Performed by: PHYSICAL THERAPIST

## 2019-11-04 PROCEDURE — 97110 THERAPEUTIC EXERCISES: CPT | Performed by: PHYSICAL THERAPIST

## 2019-11-04 NOTE — PROGRESS NOTES
Daily Note     Today's date: 2019  Patient name: Miguel Osorio  : 1955  MRN: [id number]  Referring provider: JERRI Pereyra*  Dx:   Encounter Diagnosis     ICD-10-CM    1  History of total left hip replacement Z96 642    2  Gait abnormality R26 9        Start Time: 0848  Stop Time: 933  Total time in clinic (min): 45 minutes    Subjective: Pt states he tripped while working outside this weekend and fell, but no injury  Objective: See treatment diary below    Assessment: Progressed leg press to single leg and added hip abd machine  Also progressed lateral step ups to 6 inch step  Pt did well with all progressions  Plan to progress forward step ups to 6 inch step next visit  Pt will benefit from continued skilled outpatient PT to improve strength, to address therapy goals, to reduce pain, and improve function  Plan: Continue per plan of care        Precautions: HTN, L THR anterior approach, hx of R THR    FOTO  10/4 = 25/57  10/21 = 46/57    Manual  10/4 10/07 10/11 10/14 10/17 10/21 10/24 10/29 10/31 11/4   LLE hamstring stretch, gastroc stretch, gentle ER stretch  LNK DS - hams, ER, quad, adductor DS - hams, ER, quad, adductor DS - hams, ER, quad, adductor DS - hams, ER, quad DS - hams, ER, quad DS - hams, ER, quad DS - hams, ER, quad DS - hams, ER, quad                                                           Exercise Diary  10/4 10/07 10/11 10/14 10/17 10/21 10/24 10/29 10/31 11/4   Bike Next visit 8' 8' L3 10' L3 10' L3 10' L3 8' L4 8' L4 8' L4 8'   Mini squats 2x10 3x10 HEP          Standing hip abd 2x10 each 3x10  HEP          Sit to stands HEP            Heel raises x20 x30 HEP          amb with march, butt kick, side step, heel/toe Next visit 10' x2 march & butt kick 25'x1 each with SPC 45'x1 each with SPC 45'x1 each with SPC 45'x1 each, no cane 45'x1 each, no cane tandem only x100' no cane tandem only x100' no cane    Slant board Next visit 30"x3 30"x4 30"x4 30"x4 30"x4 Weight shifting vs tandem balance Next visit WS  10x10" Wt shift x2 min  tandem 30"x2 each Single leg 30"x6 Single leg 30"x6 Single leg 30"x6 SLS on airex 30"x4 SLS on airex 30"x3   Ham stretch with strap              Balance -Feet 45'x1 each with SPC together on foam Next visit 20"x3 with UE Next visit 30"x3 no UE airex 30"x3 no UE tandem on airex - next visit tandem 30"x4 each tandem 30"x4 each tandem on airex 30"x4 tandem on airex 30"x4   Fwd step up  Next visit NV 4" x15 1UE 4" x20 1UE 4" x20, no UE 4" x20 no UE 4" x20 no UE 4" x20 no UE 4"x20   Lateral step up  Next visit NV 4" x15 BUE 4" x20 1UE 4" x20 no UE 4" x20 no UE 4" x20 no UE 4" x20 no UE 6" x20   Step downs      Next visit 4" x20 4" x20   1UE 4" x20 1UE 4" x20   Bridges   3" 2x10 3" 3x10 3" 3x10        S/l hip abd        1x12 2x10 2x10   Leg press         120# 2x10 140# 2x10    S/L 80# x20   Hip abd machine          65# 2x10                                              Modalities

## 2019-11-07 ENCOUNTER — OFFICE VISIT (OUTPATIENT)
Dept: PHYSICAL THERAPY | Facility: REHABILITATION | Age: 64
End: 2019-11-07
Payer: COMMERCIAL

## 2019-11-07 DIAGNOSIS — R26.9 GAIT ABNORMALITY: ICD-10-CM

## 2019-11-07 DIAGNOSIS — Z96.642 HISTORY OF TOTAL LEFT HIP REPLACEMENT: Primary | ICD-10-CM

## 2019-11-07 PROCEDURE — 97110 THERAPEUTIC EXERCISES: CPT | Performed by: PHYSICAL THERAPIST

## 2019-11-07 PROCEDURE — 97112 NEUROMUSCULAR REEDUCATION: CPT | Performed by: PHYSICAL THERAPIST

## 2019-11-07 NOTE — PROGRESS NOTES
Daily Note     Today's date: 2019  Patient name: Cindy Kay  : 1955  MRN: [id number]  Referring provider: JERRI Rutherford*  Dx:   Encounter Diagnosis     ICD-10-CM    1  History of total left hip replacement Z96 642    2  Gait abnormality R26 9        Start Time: 0845  Stop Time: 933  Total time in clinic (min): 48 minutes    Subjective: Pt states the hip is sore  Objective: See treatment diary below    Assessment: Pt is progressing well  He is now able to perform 6" step ups, but still requires 1UE support  Hip abd machine was also progressed  Pt will benefit from continued skilled outpatient PT to improve strength, to address therapy goals, to reduce pain, and improve function  Plan: Continue per plan of care        Precautions: HTN, L THR anterior approach, hx of R THR    FOTO  10/4 = 25/57  10/21 = 46/57    Manual  11/7       10/29 10/31 11/4   LLE hamstring stretch, gastroc stretch, gentle ER stretch DS - hams, ER, quad       DS - hams, ER, quad DS - hams, ER, quad DS - hams, ER, quad                                                           Exercise Diary  11/7       10/29 10/31 11/4   Bike L4 8'       L4 8' L4 8' L4 8'   Tandem amb        x100' no cane  x100' no cane    Slant board             Single leg balance airex  30"x3       Single leg 30"x6 SLS on airex 30"x4 SLS on airex 30"x3   Ham stretch with strap              Tandem balance 30"x4       tandem 30"x4 each tandem on airex 30"x4 tandem on airex 30"x4   Fwd step up 6" x20       4" x20 no UE 4" x20 no UE 4"x20   Lateral step up 6" x20       4" x20 no UE 4" x20 no UE 6" x20   Step downs 4"x20       4" x20   1UE 4" x20 1UE 4" x20   Bridges             S/l hip abd HEP       1x12 2x10 2x10   Leg press S/L 80# 3x10        120# 2x10 140# 2x10    S/L 80# x20   Hip abd machine 95# 2x10         65# 2x10                                              Modalities

## 2019-11-11 ENCOUNTER — OFFICE VISIT (OUTPATIENT)
Dept: PHYSICAL THERAPY | Facility: REHABILITATION | Age: 64
End: 2019-11-11
Payer: COMMERCIAL

## 2019-11-11 DIAGNOSIS — R26.9 GAIT ABNORMALITY: Primary | ICD-10-CM

## 2019-11-11 DIAGNOSIS — Z96.642 HISTORY OF TOTAL LEFT HIP REPLACEMENT: ICD-10-CM

## 2019-11-11 PROCEDURE — 97112 NEUROMUSCULAR REEDUCATION: CPT | Performed by: PHYSICAL THERAPIST

## 2019-11-11 PROCEDURE — 97110 THERAPEUTIC EXERCISES: CPT | Performed by: PHYSICAL THERAPIST

## 2019-11-11 NOTE — PROGRESS NOTES
Daily Note     Today's date: 2019  Patient name: Gus Hooper  : 1955  MRN: [id number]  Referring provider: JERRI Salcedo*  Dx:   Encounter Diagnosis     ICD-10-CM    1  Gait abnormality R26 9    2  History of total left hip replacement Z96 642        Start Time: 847  Stop Time: 927  Total time in clinic (min): 40 minutes    Subjective: Pt states the hip feels good  Objective: See treatment diary below    Assessment: Pt is progressing appropriately  He did well with progression of step downs to 6 inch step  He still fatigues quickly with exercises and is not get able to asc/desc standard height stairs without UE support/compenstaion  Pt will benefit from continued skilled outpatient PT to improve strength, to address therapy goals, to reduce pain, and improve function  Plan: Continue per plan of care        Precautions: HTN, L THR anterior approach, hx of R THR    FOTO  10/4 = 25/57  10/21 = 46/57    Manual  11/7 11/11      10/29 10/31 11/4   LLE hamstring stretch, gastroc stretch, gentle ER stretch DS - hams, ER, quad DS - hams, ER, quad      DS - hams, ER, quad DS - hams, ER, quad DS - hams, ER, quad                                                           Exercise Diary  11/7 11/11      10/29 10/31 11/4   Bike L4 8' L4 10'      L4 8' L4 8' L4 8'   Tandem amb        x100' no cane  x100' no cane    Slant board             Single leg balance airex  30"x3 Next visit      Single leg 30"x6 SLS on airex 30"x4 SLS on airex 30"x3                Tandem balance on airex 30"x4 30"x4      tandem 30"x4 each tandem on airex 30"x4 tandem on airex 30"x4   Fwd step up 6" x20 6"x20      4" x20 no UE 4" x20 no UE 4"x20   Lateral step up 6" x20 6"x20      4" x20 no UE 4" x20 no UE 6" x20   Step downs 4"x20 6"x20      4" x20   1UE 4" x20 1UE 4" x20   Bridges             S/l hip abd HEP       1x12 2x10 2x10   Leg press S/L 80# 3x10 S/L 80# 3x10       120# 2x10 140# 2x10    S/L 80# x20   Hip abd machine 95# 2x10 95# 3x10        65# 2x10                                              Modalities

## 2019-11-14 ENCOUNTER — OFFICE VISIT (OUTPATIENT)
Dept: PHYSICAL THERAPY | Facility: REHABILITATION | Age: 64
End: 2019-11-14
Payer: COMMERCIAL

## 2019-11-14 DIAGNOSIS — Z96.642 HISTORY OF TOTAL LEFT HIP REPLACEMENT: Primary | ICD-10-CM

## 2019-11-14 DIAGNOSIS — R26.9 GAIT ABNORMALITY: ICD-10-CM

## 2019-11-14 PROCEDURE — 97112 NEUROMUSCULAR REEDUCATION: CPT | Performed by: PHYSICAL THERAPIST

## 2019-11-14 PROCEDURE — 97110 THERAPEUTIC EXERCISES: CPT | Performed by: PHYSICAL THERAPIST

## 2019-11-14 NOTE — PROGRESS NOTES
Daily Note     Today's date: 2019  Patient name: Eder Devlin  : 1955  MRN: [id number]  Referring provider: JERRI Stuart*  Dx:   Encounter Diagnosis     ICD-10-CM    1  History of total left hip replacement Z96 642    2  Gait abnormality R26 9        Start Time: 8507  Stop Time: 09  Total time in clinic (min): 41 minutes    Subjective: Pt states he is doing well  Objective: See treatment diary below    Assessment: Pt was challenged by progression in steps from 6 to 8 inch  He did report some anterior knee pain and soreness following this exercise  Pt will benefit from continued skilled outpatient PT to improve strength, to address therapy goals, to reduce pain, and improve function  Plan: Continue per plan of care        Precautions: HTN, L THR anterior approach, hx of R THR    FOTO  10/4 = 25/57  10/21 = 46/57    Manual  11/7 11/11 11/14     10/29 10/31 11/4   LLE hamstring stretch, gastroc stretch, gentle ER stretch DS - hams, ER, quad DS - hams, ER, quad DS - hams, ER, quad     DS - hams, ER, quad DS - hams, ER, quad DS - hams, ER, quad                                                           Exercise Diary  11/7 11/11 11/14     10/29 10/31 11/4   Bike L4 8' L4 10' L4 10'     L4 8' L4 8' L4 8'   Tandem amb        x100' no cane  x100' no cane    Slant board             Single leg balance airex  30"x3 Next visit airex 30"x4     Single leg 30"x6 SLS on airex 30"x4 SLS on airex 30"x3                Tandem balance on airex 30"x4 30"x4 30"x4     tandem 30"x4 each tandem on airex 30"x4 tandem on airex 30"x4   Fwd step up 6" x20 6"x20 8" x20     4" x20 no UE 4" x20 no UE 4"x20   Lateral step up 6" x20 6"x20 8"x20     4" x20 no UE 4" x20 no UE 6" x20   Step downs 4"x20 6"x20 8"x20     4" x20   1UE 4" x20 1UE 4" x20   Bridges             S/l hip abd HEP       1x12 2x10 2x10   Leg press S/L 80# 3x10 S/L 80# 3x10 S/L 80# 3x10      120# 2x10 140# 2x10    S/L 80# x20   Hip abd machine 95# 2x10 95# 3x10 NV       65# 2x10                                              Modalities

## 2019-11-18 ENCOUNTER — OFFICE VISIT (OUTPATIENT)
Dept: PHYSICAL THERAPY | Facility: REHABILITATION | Age: 64
End: 2019-11-18
Payer: COMMERCIAL

## 2019-11-18 DIAGNOSIS — Z96.642 HISTORY OF TOTAL LEFT HIP REPLACEMENT: Primary | ICD-10-CM

## 2019-11-18 DIAGNOSIS — R26.9 GAIT ABNORMALITY: ICD-10-CM

## 2019-11-18 PROCEDURE — 97110 THERAPEUTIC EXERCISES: CPT | Performed by: PHYSICAL THERAPIST

## 2019-11-18 PROCEDURE — 97112 NEUROMUSCULAR REEDUCATION: CPT | Performed by: PHYSICAL THERAPIST

## 2019-11-18 NOTE — PROGRESS NOTES
Daily Note     Today's date: 2019  Patient name: Bill Hooper  : 1955  MRN: [id number]  Referring provider: JERRI Braun*  Dx:   Encounter Diagnosis     ICD-10-CM    1  History of total left hip replacement Z96 642    2  Gait abnormality R26 9        Start Time: 0845  Stop Time: 2334  Total time in clinic (min): 43 minutes    Subjective: Pt states the hip is feeling good with no issues  Objective: See treatment diary below    Assessment: Decreased step downs to 6 inch step per pt request due to anterior knee pain  Pt able to perform forward and lateral step ups without knee pain with cues to prevent anterior tibial translation  Pt requested to skip the balance today to leave a little early  Pt will benefit from continued skilled outpatient PT to improve strength, to address therapy goals, to reduce pain, and improve function  Plan: Continue per plan of care        Precautions: HTN, L THR anterior approach, hx of R THR    FOTO  10/4 = 25/57  10/21 = 46/57   = 58/57    Manual  11/7 11/11 11/14 11/18    10/29 10/31 11   LLE hamstring stretch, gastroc stretch, gentle ER stretch DS - hams, ER, quad DS - hams, ER, quad DS - hams, ER, quad DS - hams, ER, quad    DS - hams, ER, quad DS - hams, ER, quad DS - hams, ER, quad                                                           Exercise Diary  11/7 11/11 11/14 11/18    10/29 10/31 11/4   Bike L4 8' L4 10' L4 10' L4 10'    L4 8' L4 8' L4 8'   Tandem amb        x100' no cane  x100' no cane    Slant board             Single leg balance airex  30"x3 Next visit airex 30"x4 NV    Single leg 30"x6 SLS on airex 30"x4 SLS on airex 30"x3                Tandem balance on airex 30"x4 30"x4 30"x4 NV    tandem 30"x4 each tandem on airex 30"x4 tandem on airex 30"x4   Fwd step up 6" x20 6"x20 8" x20 8' x20    4" x20 no UE 4" x20 no UE 4"x20   Lateral step up 6" x20 6"x20 8"x20 8"x20    4" x20 no UE 4" x20 no UE 6" x20   Step downs 4"x20 6"x20 8"x20 6"x20    4" x20   1UE 4" x20 1UE 4" x20   Bridges             S/l hip abd HEP       1x12 2x10 2x10   Leg press S/L 80# 3x10 S/L 80# 3x10 S/L 80# 3x10 S/L 85# 3x10     120# 2x10 140# 2x10    S/L 80# x20   Hip abd machine 95# 2x10 95# 3x10 NV 95# 3x10      65# 2x10                                              Modalities

## 2019-11-21 ENCOUNTER — OFFICE VISIT (OUTPATIENT)
Dept: PHYSICAL THERAPY | Facility: REHABILITATION | Age: 64
End: 2019-11-21
Payer: COMMERCIAL

## 2019-11-21 DIAGNOSIS — Z96.642 HISTORY OF TOTAL LEFT HIP REPLACEMENT: Primary | ICD-10-CM

## 2019-11-21 DIAGNOSIS — R26.9 GAIT ABNORMALITY: ICD-10-CM

## 2019-11-21 PROCEDURE — 97110 THERAPEUTIC EXERCISES: CPT | Performed by: PHYSICAL THERAPIST

## 2019-11-21 PROCEDURE — 97112 NEUROMUSCULAR REEDUCATION: CPT | Performed by: PHYSICAL THERAPIST

## 2019-11-21 NOTE — PROGRESS NOTES
Daily Note     Today's date: 2019  Patient name: Jamal Hammonds  : 1955  MRN: [id number]  Referring provider: JERRI Tristan*  Dx:   Encounter Diagnosis     ICD-10-CM    1  History of total left hip replacement Z96 642    2  Gait abnormality R26 9        Start Time: 08  Stop Time: 935  Total time in clinic (min): 49 minutes    Subjective: Pt states he feels like therapy is helping and he is getting stronger, but still limited    Objective: See treatment diary below    Assessment: Pt is progressing appropriately with slowly improving LE strength, ROM, and balance  Pt will benefit from continued skilled outpatient PT to improve strength, to address therapy goals, to reduce pain, and improve function  Plan: Continue per plan of care        Precautions: HTN, L THR anterior approach, hx of R THR    FOTO  10/4 = 25/57  10/21 = 46/57   = 58/57    Manual          LLE hamstring stretch, gastroc stretch, gentle ER stretch DS - hams, ER, quad DS - hams, ER, quad DS - hams, ER, quad DS - hams, ER, quad DS - hams, ER, quad                                                                Exercise Diary          Bike L4 8' L4 10' L4 10' L4 10' L4 10'        Tandem amb             Slant board             Single leg balance airex  30"x3 Next visit airex 30"x4 NV airex 30"x4                     Tandem balance on airex 30"x4 30"x4 30"x4 NV airex 30"x4        Fwd step up 6" x20 6"x20 8" x20 8" x20 8" x20        Lateral step up 6" x20 6"x20 8"x20 8"x20 8" x20        Step downs 4"x20 6"x20 8"x20 6"x20 6"x20        Bridges             S/l hip abd HEP            Leg press S/L 80# 3x10 S/L 80# 3x10 S/L 80# 3x10 S/L 85# 3x10 S/L 85# 3x10        Hip abd machine 95# 2x10 95# 3x10 NV 95# 3x10 95# 3x10                                                   Modalities

## 2019-11-25 ENCOUNTER — OFFICE VISIT (OUTPATIENT)
Dept: PHYSICAL THERAPY | Facility: REHABILITATION | Age: 64
End: 2019-11-25
Payer: COMMERCIAL

## 2019-11-25 DIAGNOSIS — R26.9 GAIT ABNORMALITY: ICD-10-CM

## 2019-11-25 DIAGNOSIS — Z96.642 HISTORY OF TOTAL LEFT HIP REPLACEMENT: Primary | ICD-10-CM

## 2019-11-25 PROCEDURE — 97110 THERAPEUTIC EXERCISES: CPT | Performed by: PHYSICAL THERAPIST

## 2019-11-25 PROCEDURE — 97112 NEUROMUSCULAR REEDUCATION: CPT | Performed by: PHYSICAL THERAPIST

## 2019-11-25 NOTE — PROGRESS NOTES
Daily Note     Today's date: 2019  Patient name: Andrez Mccoy  : 1955  MRN: [id number]  Referring provider: JERRI Sears*  Dx:   Encounter Diagnosis     ICD-10-CM    1  History of total left hip replacement Z96 642    2  Gait abnormality R26 9        Start Time: 0846  Stop Time: 09  Total time in clinic (min): 44 minutes    Subjective: Pt states the hip feels good  Objective: See treatment diary below    Assessment: Pt requested to hold on steps and balance today  He took more rest breaks and reported some anterior knee pain  Pt will benefit from continued skilled outpatient PT to improve strength, to address therapy goals, to reduce pain, and improve function  Plan: Continue per plan of care        Precautions: HTN, L THR anterior approach, hx of R THR    FOTO  10/4 = 25/57  10/21 = 46/57   = 58/57    Manual         LLE hamstring stretch, gastroc stretch, gentle ER stretch DS - hams, ER, quad DS - hams, ER, quad DS - hams, ER, quad DS - hams, ER, quad DS - hams, ER, quad DS - hams, ER, quad                                                               Exercise Diary         Bike L4 8' L4 10' L4 10' L4 10' L4 10' L4 10'       Tandem amb             Slant board      30"x3       Single leg balance airex  30"x3 Next visit airex 30"x4 NV airex 30"x4 NV                    Tandem balance on airex 30"x4 30"x4 30"x4 NV airex 30"x4 NV       Fwd step up 6" x20 6"x20 8" x20 8" x20 8" x20 NV       Lateral step up 6" x20 6"x20 8"x20 8"x20 8" x20 NV       Step downs 4"x20 6"x20 8"x20 6"x20 6"x20 NV       Bridges             S/l hip abd HEP            Leg press S/L 80# 3x10 S/L 80# 3x10 S/L 80# 3x10 S/L 85# 3x10 S/L 85# 3x10 S/L 85# 3x10       Hip abd machine 95# 2x10 95# 3x10 NV 95# 3x10 95# 3x10 95# 3x10                                                  Modalities

## 2019-11-29 ENCOUNTER — OFFICE VISIT (OUTPATIENT)
Dept: PHYSICAL THERAPY | Facility: REHABILITATION | Age: 64
End: 2019-11-29
Payer: COMMERCIAL

## 2019-11-29 DIAGNOSIS — R26.9 GAIT ABNORMALITY: ICD-10-CM

## 2019-11-29 DIAGNOSIS — Z96.642 HISTORY OF TOTAL LEFT HIP REPLACEMENT: Primary | ICD-10-CM

## 2019-11-29 PROCEDURE — 97112 NEUROMUSCULAR REEDUCATION: CPT | Performed by: PHYSICAL THERAPIST

## 2019-11-29 PROCEDURE — 97110 THERAPEUTIC EXERCISES: CPT | Performed by: PHYSICAL THERAPIST

## 2019-11-29 NOTE — PROGRESS NOTES
Daily Note     Today's date: 2019  Patient name: Eder Devlin  : 1955  MRN: [id number]  Referring provider: JERRI Stuart*  Dx:   Encounter Diagnosis     ICD-10-CM    1  History of total left hip replacement Z96 642    2  Gait abnormality R26 9        Start Time: 08  Stop Time: 933  Total time in clinic (min): 46 minutes    Subjective: Pt states the hip is feeling pretty good but reports he has been getting some twitching in the quad at night the past few nights  Objective: See treatment diary below    Assessment:  Pt continues to be challenged by tandem balance on foam and step downs, but overall is doing well  Pt will benefit from continued skilled outpatient PT to improve strength, to address therapy goals, to reduce pain, and improve function  Plan: Continue per plan of care        Precautions: HTN, L THR anterior approach, hx of R THR    FOTO  10/4 = 25/57  10/21 = 46/57   = 58/57    Manual        LLE hamstring stretch, gastroc stretch, gentle ER stretch DS - hams, ER, quad DS - hams, ER, quad DS - hams, ER, quad DS - hams, ER, quad DS - hams, ER, quad DS - hams, ER, quad DS - hams, ER, quad                                                              Exercise Diary        Bike L4 8' L4 10' L4 10' L4 10' L4 10' L4 10' L4 10'      Tandem amb             Slant board      30"x3 30"x3      Single leg balance airex  30"x3 Next visit airex 30"x4 NV airex 30"x4 NV NV                   Tandem balance on airex 30"x4 30"x4 30"x4 NV airex 30"x4 NV airex 30"x4      Fwd step up 6" x20 6"x20 8" x20 8" x20 8" x20 NV 8"x20      Lateral step up 6" x20 6"x20 8"x20 8"x20 8" x20 NV 8"x20      Step downs 4"x20 6"x20 8"x20 6"x20 6"x20 NV 8"x20      Bridges             S/l hip abd HEP            Leg press S/L 80# 3x10 S/L 80# 3x10 S/L 80# 3x10 S/L 85# 3x10 S/L 85# 3x10 S/L 85# 3x10 NV      Hip abd machine 95# 2x10 95# 3x10 NV 95# 3x10 95# 3x10 95# 3x10 NV                                                 Modalities

## 2019-12-03 ENCOUNTER — OFFICE VISIT (OUTPATIENT)
Dept: PHYSICAL THERAPY | Facility: REHABILITATION | Age: 64
End: 2019-12-03
Payer: COMMERCIAL

## 2019-12-03 DIAGNOSIS — R26.9 GAIT ABNORMALITY: ICD-10-CM

## 2019-12-03 DIAGNOSIS — Z96.642 HISTORY OF TOTAL LEFT HIP REPLACEMENT: Primary | ICD-10-CM

## 2019-12-03 PROCEDURE — 97110 THERAPEUTIC EXERCISES: CPT | Performed by: PHYSICAL THERAPIST

## 2019-12-03 PROCEDURE — 97112 NEUROMUSCULAR REEDUCATION: CPT | Performed by: PHYSICAL THERAPIST

## 2019-12-03 NOTE — PROGRESS NOTES
Daily Note     Today's date: 12/3/2019  Patient name: Chin Barrios  : 1955  MRN: [id number]  Referring provider: JERRI Agustin*  Dx:   Encounter Diagnosis     ICD-10-CM    1  History of total left hip replacement Z96 642    2  Gait abnormality R26 9        Start Time: 1533  Stop Time: 1622  Total time in clinic (min): 49 minutes    Subjective: Pt states the hip is doing well but the R knee has been swollen and painful  Objective: See treatment diary below    Assessment: Pt was challenged by side stepping with tband, but responded well with an improvement in knee pain  Held on steps today due to knee pain  Pt will benefit from continued skilled outpatient PT to improve strength, to address therapy goals, to reduce pain, and improve function  Plan: Continue per plan of care        Precautions: HTN, L THR anterior approach, hx of R THR    FOTO  10/4 = 25/57  10/21 = 46/57   = 58/57    Manual   12/3     LLE hamstring stretch, gastroc stretch, gentle ER stretch DS - hams, ER, quad DS - hams, ER, quad DS - hams, ER, quad DS - hams, ER, quad DS - hams, ER, quad DS - hams, ER, quad DS - hams, ER, quad DS - hams, ER, quad                                                             Exercise Diary   12/3     Bike L4 8' L4 10' L4 10' L4 10' L4 10' L4 10' L4 10' L4 10'     Tandem amb             Slant board      30"x3 30"x3 30"x4     Single leg balance airex  30"x3 Next visit airex 30"x4 NV airex 30"x4 NV NV airex 30"x4                  Tandem balance on airex 30"x4 30"x4 30"x4 NV airex 30"x4 NV airex 30"x4 30"x4     Fwd step up 6" x20 6"x20 8" x20 8" x20 8" x20 NV 8"x20      Lateral step up 6" x20 6"x20 8"x20 8"x20 8" x20 NV 8"x20      Step downs 4"x20 6"x20 8"x20 6"x20 6"x20 NV 8"x20      Bridges             S/l hip abd HEP            Leg press S/L 80# 3x10 S/L 80# 3x10 S/L 80# 3x10 S/L 85# 3x10 S/L 85# 3x10 S/L 85# 3x10 NV      Hip abd machine 95# 2x10 95# 3x10 NV 95# 3x10 95# 3x10 95# 3x10 NV      Tband side step        YTB x20 steps L+R                                   Modalities

## 2019-12-05 ENCOUNTER — EVALUATION (OUTPATIENT)
Dept: PHYSICAL THERAPY | Facility: REHABILITATION | Age: 64
End: 2019-12-05
Payer: COMMERCIAL

## 2019-12-05 DIAGNOSIS — R26.9 GAIT ABNORMALITY: ICD-10-CM

## 2019-12-05 DIAGNOSIS — M25.561 ACUTE PAIN OF RIGHT KNEE: ICD-10-CM

## 2019-12-05 DIAGNOSIS — Z96.642 HISTORY OF TOTAL LEFT HIP REPLACEMENT: Primary | ICD-10-CM

## 2019-12-05 PROCEDURE — 97164 PT RE-EVAL EST PLAN CARE: CPT | Performed by: PHYSICAL THERAPIST

## 2019-12-05 PROCEDURE — 97112 NEUROMUSCULAR REEDUCATION: CPT | Performed by: PHYSICAL THERAPIST

## 2019-12-05 NOTE — PROGRESS NOTES
PT Re-Evaluation - R knee pain eval    Today's date: 2019  Patient name: Antonia Oneal  : 1955  MRN: [id number]  Referring provider: JERRI Cardenas*  Dx:   Encounter Diagnosis     ICD-10-CM    1  History of total left hip replacement Z96 642    2  Gait abnormality R26 9    3  Acute pain of right knee M25 561        Start Time: 0848  Stop Time: 09  Total time in clinic (min): 43 minutes    Assessment  Assessment details: Pt is a pleasant 60 yo M currently being treated in PT s/p L THR  Pt reported onset of R knee pain and presents today for evaluation of the R knee  Subjective report and examination findings are consistent with patellofemoral pain syndrome/patellar tendinitis  Presents with anterior knee pain that is worse with walking and stairs  Also presents with decreased R knee ROM and hip weakness, most notably in the hip abductors  These impairments are limiting tolerance to stairs which is required for household negotiation and ADLs  Pt will benefit from skilled outpatient PT to reduce pain and improve function  Therapist explained to pt: findings of IE, rehab diagnosis, and POC  Pt-centered goals reviewed and confirmed by pt  Instruction also given for HEP and movement precautions  Pt expressed verbal agreement and understanding and verified understanding via teach back method  Pt also expressed satisfaction that their current concerns were addressed at session end  Impairments: abnormal gait, abnormal or restricted ROM, activity intolerance, impaired balance, impaired physical strength, lacks appropriate home exercise program, pain with function and weight-bearing intolerance  Barriers to therapy: None   Understanding of Dx/Px/POC: excellent  Goals  Hip goals  Short term goals: to be met in 4 weeks  Pt independent with initial HEP, rationale, technique and frequency, for ROM and pain control    MET  Improve L hip flex, abd to 3/5 of greater for improved ease of transfers and ADLs Not assessed today  Pt will manage 10-15 minutes of continuous activity for general conditioning and endorphin release for pain management using Bike  MET  Pt will be able to stand/walk for at least 30 min without an assistive device, independently without an increase in pain or stiffness to be able to perform work/household duties  MET  Pt will perform the TUG test in less than <14 seconds indicating decreased risk for falls  Not assessed today  Pt able to perform 1 sit to stand without UE support to better manage functional transfers - MET    Long term goals: to be met in 8 weeks  Pt will have WFL and pain free L hip ROM which is required for transfers, dressing/bathing, and ambulation  Pt will report a max pain level of 2/10 indicating an overall improvement in hip pain  Pt will be able to asc/desc a flight of stairs step over step, Mod I with minimal to no hip pain to be able to get to and from second floor of house  Pt will improve L hip strength to at least a 4/5 throughout for improved functional mobility and joint protection  Pt able to perform 5 times sit to stand test in <15 sec indicating improved functional strength and decreased risk for falls  Pt independent in final HEP to maintain gains made in therapy  Pt will improve FOTO score to better then expected outcome indicating an overall improvement in pain and function       Knee goals: to be met in 10 visits:  Pt will report a 75% or > reduction in subjective pain complaints/symptoms to better manage ADLs and functional mobility  Pt will demonstrate full and pain free R knee AROM for ease of transfers, ADLs, and ambulation  Increase R hip abd MMT to 4/5 or greater to reduce knee strain    Pt will improve FOTO score to better then expected outcome indicating an overall improvement in pain and function   Pt able to reciprocally ascend/descend 1 flight of stairs without pain or substitution to be able to get to the second floor in pt's home   Pt independent with rationale, technique and plan for performance of advanced HEP to ensure independent self-management of sx's  Plan  Patient would benefit from: skilled physical therapy  Planned modality interventions: TENS, thermotherapy: hydrocollator packs, traction, ultrasound, cryotherapy and low level laser therapy  Planned therapy interventions: body mechanics training, therapeutic training, therapeutic exercise, therapeutic activities, stretching, strengthening, neuromuscular re-education, patient education, home exercise program, functional ROM exercises, flexibility, manual therapy, [place] taping, joint mobilization, balance, gait training and balance/weight bearing training  Frequency: 3x week  Duration in weeks: 8  Treatment plan discussed with: patient        Subjective Evaluation    History of Present Illness  Mechanism of injury: Pt presents with R knee pain  Pain started 3 days ago with no known cause  Pain started in the front of the knee, but now has swelling and pain in the back of the knee  Denies n/t  No pain with sitting at rest  Pain increases with walking, stairs  Worse going up the stairs  Denies catching or popping but does note clicking  Denies knee getting stuck of giving way            Not a recurrent problem   Quality of life: good    Pain  Current pain ratin  At best pain ratin  At worst pain ratin  Location: anterior R knee and back of knee  Quality: dull ache  Relieving factors: rest and relaxation  Aggravating factors: walking and stair climbing  Progression: improved    Patient Goals  Patient goals for therapy: decreased pain          Objective     Active Range of Motion   Left Knee   Flexion: 122 degrees   Extension: 5 (hyperext) degrees     Right Knee   Flexion: 110 degrees with pain  Extension: 5 (hyperext) degrees with pain    Additional Active Range of Motion Details  No joint line tenderness  Mild swelling around the patella, pain with patellar inf/sup gliding    Strength/Myotome Testing     Right Hip   Planes of Motion   Extension: 4+  Abduction: 3-    Right Knee   Prone flexion: 5  Extension: 5    Tests     Right Knee   Negative anterior drawer, valgus stress test at 0 degrees, valgus stress test at 30 degrees, varus stress test at 0 degrees and varus stress test at 30 degrees       General Comments:      Knee Comments  Step down test:  R= 5/6    Total score of 0 or 1 was classified as good quality of movement  Total score of 2 or 3 was classified as medium quality  Total score of 4 or above was classified as poor quality of movement    Arm Strategy - Removal of hand from waist +1  Trunk Alignment -Leaning in any direction +1  Pelvic Plane -  Loss of horizontal plane +1  Knee Position               Tibial Tuberosity medial to second toe +1   Tibial Tuberosity medial to medial border of foot +2  Steady Stance - Patient stepped onto non-tested limb, or foot wavered from side to side +1        Flowsheet Rows      Most Recent Value   PT/OT G-Codes   Current Score  39   Projected Score  65           Precautions: HTN, L THR anterior approach, hx of R THR     FOTO  L hip  10/4 = 25/57  10/21 = 46/57  11/18 = 58/57    R knee  12/5 = 39/65     Manual  11/7 11/11 11/14 11/18 11/21 11/25 11/29 12/3  12/5     LLE hamstring stretch, gastroc stretch, gentle ER stretch DS - hams, ER, quad DS - hams, ER, quad DS - hams, ER, quad DS - hams, ER, quad DS - hams, ER, quad DS - hams, ER, quad DS - hams, ER, quad DS - hams, ER, quad                                Eval R knee                 DS                                                           Exercise Diary  11/7 11/11 11/14 11/18 11/21 11/25 11/29 12/3  12/5     Bike L4 8' L4 10' L4 10' L4 10' L4 10' L4 10' L4 10' L4 10'       Tandem amb                       Slant board           30"x3 30"x3 30"x4       Single leg balance airex  30"x3 Next visit airex 30"x4 NV airex 30"x4 NV NV airex 30"x4                             Tandem balance on airex 30"x4 30"x4 30"x4 NV airex 30"x4 NV airex 30"x4 30"x4       Fwd step up 6" x20 6"x20 8" x20 8" x20 8" x20 NV 8"x20         Lateral step up 6" x20 6"x20 8"x20 8"x20 8" x20 NV 8"x20         Step downs 4"x20 6"x20 8"x20 6"x20 6"x20 NV 8"x20         Bridges                       S/l hip abd HEP                     Leg press S/L 80# 3x10 S/L 80# 3x10 S/L 80# 3x10 S/L 85# 3x10 S/L 85# 3x10 S/L 85# 3x10 NV         Hip abd machine 95# 2x10 95# 3x10 NV 95# 3x10 95# 3x10 95# 3x10 NV         Tband side step               YTB x20 steps L+R  YTB x50 steps L+R      s/l clamshells                 YTB x20                                   Modalities

## 2019-12-10 ENCOUNTER — APPOINTMENT (OUTPATIENT)
Dept: PHYSICAL THERAPY | Facility: REHABILITATION | Age: 64
End: 2019-12-10
Payer: COMMERCIAL

## 2019-12-10 NOTE — PROGRESS NOTES
Daily Note     Today's date: 12/10/2019  Patient name: Constance Hudson  : 1955  MRN: [id number]  Referring provider: JERRI Santos*  Dx:   Encounter Diagnosis     ICD-10-CM    1  History of total left hip replacement Z96 642    2  Gait abnormality R26 9    3  Acute pain of right knee M25 561                   Subjective: ***      Objective: See treatment diary below      Assessment:    Pt will benefit from continued skilled outpatient PT to improve strength, to address therapy goals, to reduce pain, and improve function  Plan: Continue per plan of care  Progress treatment as tolerated         Precautions: HTN, L THR anterior approach, hx of R THR     FOTO  L hip  10/4 = 25/57  10/21 = 46/57   = 58/57    R knee   = 39/65     Manual  11/7 11/11 11/14 11/18 11/21 11/25 11/29 12/3  12/5  12/10   LLE hamstring stretch, gastroc stretch, gentle ER stretch DS - hams, ER, quad DS - hams, ER, quad DS - hams, ER, quad DS - hams, ER, quad DS - hams, ER, quad DS - hams, ER, quad DS - hams, ER, quad DS - hams, ER, quad                                Eval R knee                 DS                                                           Exercise Diary  11/7 11/11 11/14 11/18 11/21 11/25 11/29 12/3  12/5  12/10   Bike L4 8' L4 10' L4 10' L4 10' L4 10' L4 10' L4 10' L4 10'       Tandem amb                       Slant board           30"x3 30"x3 30"x4       Single leg balance airex  30"x3 Next visit airex 30"x4 NV airex 30"x4 NV NV airex 30"x4                               Tandem balance on airex 30"x4 30"x4 30"x4 NV airex 30"x4 NV airex 30"x4 30"x4       Fwd step up 6" x20 6"x20 8" x20 8" x20 8" x20 NV 8"x20         Lateral step up 6" x20 6"x20 8"x20 8"x20 8" x20 NV 8"x20         Step downs 4"x20 6"x20 8"x20 6"x20 6"x20 NV 8"x20         Bridges                       S/l hip abd HEP                     Leg press S/L 80# 3x10 S/L 80# 3x10 S/L 80# 3x10 S/L 85# 3x10 S/L 85# 3x10 S/L 85# 3x10 NV       Hip abd machine 95# 2x10 95# 3x10 NV 95# 3x10 95# 3x10 95# 3x10 NV         Tband side step               YTB x20 steps L+R  YTB x50 steps L+R      s/l loi                 YTB x20                                   Modalities

## 2019-12-13 ENCOUNTER — OFFICE VISIT (OUTPATIENT)
Dept: PHYSICAL THERAPY | Facility: REHABILITATION | Age: 64
End: 2019-12-13
Payer: COMMERCIAL

## 2019-12-13 DIAGNOSIS — M25.561 ACUTE PAIN OF RIGHT KNEE: ICD-10-CM

## 2019-12-13 DIAGNOSIS — Z96.642 HISTORY OF TOTAL LEFT HIP REPLACEMENT: Primary | ICD-10-CM

## 2019-12-13 DIAGNOSIS — R26.9 GAIT ABNORMALITY: ICD-10-CM

## 2019-12-13 PROCEDURE — 97110 THERAPEUTIC EXERCISES: CPT | Performed by: PHYSICAL THERAPIST

## 2019-12-13 PROCEDURE — 97112 NEUROMUSCULAR REEDUCATION: CPT | Performed by: PHYSICAL THERAPIST

## 2019-12-13 NOTE — PROGRESS NOTES
Daily Note     Today's date: 2019  Patient name: Chin Barrios  : 1955  MRN: [id number]  Referring provider: JERRI Agustin*  Dx:   Encounter Diagnosis     ICD-10-CM    1  History of total left hip replacement Z96 642    2  Gait abnormality R26 9    3  Acute pain of right knee M25 561        Start Time: 0915  Stop Time: 1003  Total time in clinic (min): 48 minutes    Subjective: Pt states the knee feels the same and states he feels like he is still compensating for the hip  Saw the surgeon and also had xrays of the knees performed  Was told he has age appropriate OA  Objective: See treatment diary below      Assessment: Pt required cues during clamshells for correct form  He fatigued with hip abduction strengthening, but responded well  He tolerated IASTM to the R quad with some discomfort and R knee flexion in prone was noted to be limited secondary to quad/hip flexor tightness  Pt will benefit from continued skilled outpatient PT to improve strength, to address therapy goals, to reduce pain, and improve function  Plan: Continue per plan of care  Progress treatment as tolerated         Precautions: HTN, L THR anterior approach, hx of R THR     FOTO  L hip  10/4 = 25/57  10/21 = 46/57   = 58/57   = 60/57    R knee   = 39/65     Manual  11/7 11/11 11/14 11/18 11/21 11/25 11/29 12/3  12/5  12/13   LLE hamstring stretch, gastroc stretch, gentle ER stretch DS - hams, ER, quad DS - hams, ER, quad DS - hams, ER, quad DS - hams, ER, quad DS - hams, ER, quad DS - hams, ER, quad DS - hams, ER, quad DS - hams, ER, quad    DS - hams, ER, quad                            Eval R knee                 DS      R - hams stretch, quad stretch, STM to quad                   DS                                 Exercise Diary  11/7 11/11 11/14 11/18 11/21 11/25 11/29 12/3  12/5  12/13   Bike L4 8' L4 10' L4 10' L4 10' L4 10' L4 10' L4 10' L4 10'    L4 10'   Tandem amb                     Slant board           30"x3 30"x3 30"x4    30"x4   Single leg balance airex  30"x3 Next visit airex 30"x4 NV airex 30"x4 NV NV airex 30"x4                               Tandem balance on airex 30"x4 30"x4 30"x4 NV airex 30"x4 NV airex 30"x4 30"x4       Fwd step up 6" x20 6"x20 8" x20 8" x20 8" x20 NV 8"x20         Lateral step up 6" x20 6"x20 8"x20 8"x20 8" x20 NV 8"x20         Step downs 4"x20 6"x20 8"x20 6"x20 6"x20 NV 8"x20         Bridges                       S/l hip abd HEP                     Leg press S/L 80# 3x10 S/L 80# 3x10 S/L 80# 3x10 S/L 85# 3x10 S/L 85# 3x10 S/L 85# 3x10 NV         Hip abd machine 95# 2x10 95# 3x10 NV 95# 3x10 95# 3x10 95# 3x10 NV         Tband side step               YTB x20 steps L+R  YTB x50 steps L+R  yellow 2x10 each    s/l clamshells                 YTB x20  [place] hip halo  2x10 B/L    s/l hip abd - R                   2x10         Modalities

## 2019-12-17 ENCOUNTER — OFFICE VISIT (OUTPATIENT)
Dept: PHYSICAL THERAPY | Facility: REHABILITATION | Age: 64
End: 2019-12-17
Payer: COMMERCIAL

## 2019-12-17 DIAGNOSIS — Z96.642 HISTORY OF TOTAL LEFT HIP REPLACEMENT: Primary | ICD-10-CM

## 2019-12-17 DIAGNOSIS — R26.9 GAIT ABNORMALITY: ICD-10-CM

## 2019-12-17 DIAGNOSIS — M25.561 ACUTE PAIN OF RIGHT KNEE: ICD-10-CM

## 2019-12-17 PROCEDURE — 97110 THERAPEUTIC EXERCISES: CPT | Performed by: PHYSICAL THERAPIST

## 2019-12-17 PROCEDURE — 97112 NEUROMUSCULAR REEDUCATION: CPT | Performed by: PHYSICAL THERAPIST

## 2019-12-17 NOTE — PROGRESS NOTES
Daily Note     Today's date: 2019  Patient name: Heide Huerta  : 1955  MRN: [id number]  Referring provider: JERRI Swan*  Dx:   Encounter Diagnosis     ICD-10-CM    1  History of total left hip replacement Z96 642    2  Gait abnormality R26 9    3  Acute pain of right knee M25 561        Start Time: 7865  Stop Time: 0915  Total time in clinic (min): 43 minutes    Subjective: Pt states the knee feels the same and states he isn't feeling well on top of that  States the knee is taking away from rehabing the hip      Objective: See treatment diary below      Assessment: R knee prone flexion remains limited and pt continues to have distal quad tightness  Pt admits to not performing hip abd strengthening HEP  Advised pt to perform standing hip abd at home  Pt will benefit from continued skilled outpatient PT to improve strength, to address therapy goals, to reduce pain, and improve function  Plan: Continue per plan of care  Progress treatment as tolerated         Precautions: HTN, L THR anterior approach, hx of R THR     FOTO  L hip  10/4 = 25/57  10/21 = 46/57   = 58/57   = 60/57    R knee   = 39/65     Manual  12/17       12/3  12/5  12/13   LLE hamstring stretch, gastroc stretch, gentle ER stretch DS - hams, ER, quad       DS - hams, ER, quad    DS - hams, ER, quad                     Eval R knee          DS      R - hams stretch, quad stretch, STM to quad DS           DS                                 Exercise Diary  12/17     11/25 11/29 12/3  12/5  12/13   Bike L4 10'     L4 10' L4 10' L4 10'    L4 10'   Slant board      30"x3 30"x3 30"x4    30"x4   Single leg balance      NV NV airex 30"x4                          Tandem balance on airex      NV airex 30"x4 30"x4       Fwd step up      NV 8"x20         Lateral step up      NV 8"x20         Step downs      NV 8"x20         SLR flex - R 2x10                 S/l hip abd                  Leg press      S/L 85# 3x10 NV         Hip abd machine      95# 3x10 NV         Tband side step NV         YTB x20 steps L+R  YTB x50 steps L+R  yellow 2x10 each    s/l clamshells [place] hip halo  2x10 B/L               YTB x20  [place] hip halo  2x10 B/L    s/l hip abd - R  NV                 2x10   Standing hip abd [place] hip halo 2x10 each                  Modalities

## 2019-12-20 ENCOUNTER — OFFICE VISIT (OUTPATIENT)
Dept: PHYSICAL THERAPY | Facility: REHABILITATION | Age: 64
End: 2019-12-20
Payer: COMMERCIAL

## 2019-12-20 DIAGNOSIS — Z96.642 HISTORY OF TOTAL LEFT HIP REPLACEMENT: Primary | ICD-10-CM

## 2019-12-20 DIAGNOSIS — R26.9 GAIT ABNORMALITY: ICD-10-CM

## 2019-12-20 DIAGNOSIS — M25.561 ACUTE PAIN OF RIGHT KNEE: ICD-10-CM

## 2019-12-20 PROCEDURE — 97110 THERAPEUTIC EXERCISES: CPT | Performed by: PHYSICAL THERAPIST

## 2019-12-20 PROCEDURE — 97112 NEUROMUSCULAR REEDUCATION: CPT | Performed by: PHYSICAL THERAPIST

## 2019-12-20 NOTE — PROGRESS NOTES
Daily Note     Today's date: 2019  Patient name: Dani Escobar  : 1955  MRN: [id number]  Referring provider: JERRI Aguilar*  Dx:   Encounter Diagnosis     ICD-10-CM    1  History of total left hip replacement Z96 642    2  Gait abnormality R26 9    3  Acute pain of right knee M25 561        Start Time: 0900  Stop Time: 0946  Total time in clinic (min): 46 minutes    Subjective: Pt states the knee is feeling a little better  Objective: See treatment diary below      Assessment: Pt required cues during standing hip abd to prevent lateral trunk lean, but he responded well to progression in band resistance  He was more challenged with s/l leg press on the L compared to the R  Pt will benefit from continued skilled outpatient PT to improve strength, to address therapy goals, to reduce pain, and improve function  Plan: Continue per plan of care  Progress treatment as tolerated         Precautions: HTN, L THR anterior approach, hx of R THR     FOTO  L hip  10/4 = 25/57  10/21 = 46/57   = 58/57   = 60/57    R knee   = 39/65     Manual  12/17 12/20      12/3  12/5  12/13   LLE hamstring stretch, gastroc stretch, gentle ER stretch DS - hams, ER, quad DS - hams, ER, quad      DS - hams, ER, quad    DS - hams, ER, quad                     Eval R knee          DS      R - hams stretch, quad stretch, STM to quad DS DS          DS                                 Exercise Diary  12/17 12/20    11/25 11/29 12/3  12/5  12/13   Bike L4 10' L4 12'    L4 10' L4 10' L4 10'    L4 10'   Slant board      30"x3 30"x3 30"x4    30"x4   Single leg balance      NV NV airex 30"x4                          Tandem balance on airex      NV airex 30"x4 30"x4       Fwd step up      NV 8"x20         Lateral step up      NV 8"x20         Step downs      NV 8"x20         SLR flex - R 2x10                 S/l hip abd                  Leg press  S/L 85# 2x10 B/L    S/L 85# 3x10 NV         Hip abd machine 95# 3x10 NV         Tband side step NV         YTB x20 steps L+R  YTB x50 steps L+R  yellow 2x10 each    s/l clamshells [place] hip halo  2x10 B/L               YTB x20  [place] hip halo  2x10 B/L    s/l hip abd - R  NV  2x10 B/L               2x10   Standing hip abd [place] hip halo 2x10 each GTB 2x10 each                 Modalities

## 2019-12-24 ENCOUNTER — OFFICE VISIT (OUTPATIENT)
Dept: PHYSICAL THERAPY | Facility: REHABILITATION | Age: 64
End: 2019-12-24
Payer: COMMERCIAL

## 2019-12-24 DIAGNOSIS — R26.9 GAIT ABNORMALITY: ICD-10-CM

## 2019-12-24 DIAGNOSIS — Z96.642 HISTORY OF TOTAL LEFT HIP REPLACEMENT: Primary | ICD-10-CM

## 2019-12-24 DIAGNOSIS — M25.561 ACUTE PAIN OF RIGHT KNEE: ICD-10-CM

## 2019-12-24 PROCEDURE — 97110 THERAPEUTIC EXERCISES: CPT | Performed by: PHYSICAL THERAPIST

## 2019-12-24 PROCEDURE — 97112 NEUROMUSCULAR REEDUCATION: CPT | Performed by: PHYSICAL THERAPIST

## 2019-12-24 PROCEDURE — 97140 MANUAL THERAPY 1/> REGIONS: CPT | Performed by: PHYSICAL THERAPIST

## 2019-12-24 NOTE — PROGRESS NOTES
Daily Note     Today's date: 2019  Patient name: Chiquis Massey  : 1955  MRN: [id number]  Referring provider: JERRI Reed*  Dx:   Encounter Diagnosis     ICD-10-CM    1  History of total left hip replacement Z96 642    2  Gait abnormality R26 9    3  Acute pain of right knee M25 561        Start Time: 0912  Stop Time: 1000  Total time in clinic (min): 48 minutes    Subjective: Pt states the knee is feeling better but he thinks he is getting plantar fasciitis now  Objective: See treatment diary below      Assessment: Pt required cues during SLR abd and standing hip abd to prevent compensation  Added slant board with higher incline to address gastroc tightness  Pt responded well to this  Pt will benefit from continued skilled outpatient PT to improve strength, to address therapy goals, to reduce pain, and improve function  Plan: Continue per plan of care  Progress treatment as tolerated         Precautions: HTN, L THR anterior approach, hx of R THR     FOTO  L hip  10/4 = 25/57  10/21 = 46/57   = 58/57   = 60/57    R knee   = 39/65     Manual            LLE hamstring stretch, gastroc stretch, gentle ER stretch DS - hams, ER, quad DS - hams, ER, quad DS - hams, ER, quad                         Eval R knee              R - hams stretch, quad stretch, STM to quad DS DS DS                                        Exercise Diary            Bike L4 10' L4 12' L4 12'          Slant board   30"x6          Single leg balance                           Tandem balance on airex             Fwd step up             Lateral step up             Step downs             SLR flex - R 2x10            S/l hip abd             Leg press  S/L 85# 2x10 B/L           Hip abd machine             Tband side step NV             s/l clamshells [place] hip halo  2x10 B/L                  s/l hip abd - R  NV  2x10 B/L  2x10 B/L             Standing hip abd [place] hip halo 2x10 each GTB 2x10 each GTB 2x10 each                Modalities

## 2020-01-06 ENCOUNTER — OFFICE VISIT (OUTPATIENT)
Dept: PHYSICAL THERAPY | Facility: REHABILITATION | Age: 65
End: 2020-01-06
Payer: COMMERCIAL

## 2020-01-06 DIAGNOSIS — M25.561 ACUTE PAIN OF RIGHT KNEE: ICD-10-CM

## 2020-01-06 DIAGNOSIS — R26.9 GAIT ABNORMALITY: ICD-10-CM

## 2020-01-06 DIAGNOSIS — Z96.642 HISTORY OF TOTAL LEFT HIP REPLACEMENT: Primary | ICD-10-CM

## 2020-01-06 PROCEDURE — 97112 NEUROMUSCULAR REEDUCATION: CPT | Performed by: PHYSICAL THERAPIST

## 2020-01-06 PROCEDURE — 97140 MANUAL THERAPY 1/> REGIONS: CPT | Performed by: PHYSICAL THERAPIST

## 2020-01-06 PROCEDURE — 97110 THERAPEUTIC EXERCISES: CPT | Performed by: PHYSICAL THERAPIST

## 2020-01-06 NOTE — PROGRESS NOTES
Daily Note     Today's date: 2020  Patient name: Felix Basilio  : 1955  MRN: [id number]  Referring provider: JERRI Hodges*  Dx:   Encounter Diagnosis     ICD-10-CM    1  History of total left hip replacement Z96 642    2  Gait abnormality R26 9    3  Acute pain of right knee M25 561        Start Time: 0930  Stop Time: 1015  Total time in clinic (min): 45 minutes    Subjective: Pt states the knee and hip feel about this same  Thinks he may have been overdoing it and states the R ankle swelled up a bit  Noticed the swelling last night, but reports stiffness all day yesterday  Objective: See treatment diary below      Assessment: Observation of the ankles/leg reveals B LE swelling, slightly more in the RLE  No warmth and no calf tenderness, but pt advised to follow up with PCP anyway  Pt required cues during SLR hip abd to prevent hip flexion compensation  He completed lateral step ups with no report of pain, but was given cues to step down softely and control the movement  Pt will benefit from continued skilled outpatient PT to improve strength, to address therapy goals, to reduce pain, and improve function  Plan: Continue per plan of care  Progress treatment as tolerated         Precautions: HTN, L THR anterior approach, hx of R THR     FOTO  L hip  10/4 = 25/57  10 = 46/57   = 58/57  12 = 60/57    R knee   = 39/65   = 48/65     Manual           LLE hamstring stretch, gastroc stretch, gentle ER stretch DS - hams, ER, quad DS - hams, ER, quad DS - hams, ER, quad DS - hams, ER, quad                        Eval R knee              R - hams stretch, quad stretch, STM to quad DS DS DS DS                                       Exercise Diary           Bike L4 10' L4 12' L4 12' L4 10'         Slant board   30"x6 30"x6         Fwd step up             Lateral step up    8" x20 each         Step downs             SLR flex - R 2x10 Leg press  S/L 85# 2x10 B/L           Hip abd machine             Tband side step NV             s/l clamshells [place] hip halo  2x10 B/L                  s/l hip abd ]  NV  2x10 B/L  2x10 B/L  2x10 B/L           Standing hip abd [place] hip halo 2x10 each GTB 2x10 each GTB 2x10 each GTB 2x10 each               Modalities

## 2020-01-09 ENCOUNTER — OFFICE VISIT (OUTPATIENT)
Dept: PHYSICAL THERAPY | Facility: REHABILITATION | Age: 65
End: 2020-01-09
Payer: COMMERCIAL

## 2020-01-09 DIAGNOSIS — Z96.642 HISTORY OF TOTAL LEFT HIP REPLACEMENT: Primary | ICD-10-CM

## 2020-01-09 DIAGNOSIS — R26.9 GAIT ABNORMALITY: ICD-10-CM

## 2020-01-09 PROCEDURE — 97110 THERAPEUTIC EXERCISES: CPT | Performed by: PHYSICAL THERAPIST

## 2020-01-09 PROCEDURE — 97140 MANUAL THERAPY 1/> REGIONS: CPT | Performed by: PHYSICAL THERAPIST

## 2020-01-09 PROCEDURE — 97112 NEUROMUSCULAR REEDUCATION: CPT | Performed by: PHYSICAL THERAPIST

## 2020-01-09 NOTE — PROGRESS NOTES
Daily Note     Today's date: 2020  Patient name: Loki Medeiros  : 1955  MRN: [id number]  Referring provider: JERRI Barrera*  Dx:   Encounter Diagnosis     ICD-10-CM    1  History of total left hip replacement Z96 642    2  Gait abnormality R26 9        Start Time: 933  Stop Time: 1020  Total time in clinic (min): 47 minutes    Subjective: Pt states the knee is doing better so he will continue HEP for the knee  States the L hip progress has been frustrating and he feels the leg is getting weaker  Objective: See treatment diary below      Assessment: Pt fatigues quickly with hip abductor exercises  Trialed progressing leg press to 90 lbs, but pt reported difficulty and some pain  Pt is somewhat fearful of progressing exercises, but with encouragement is able to progress  He reported improved ease of leg press with increased repetition  Pt will benefit from continued skilled outpatient PT to improve strength, to address therapy goals, to reduce pain, and improve function  Plan: Continue per plan of care  Progress treatment as tolerated  Next visit decrease bike to 5 min and focus more on LLE strengthening and neuromuscular control exercises       Precautions: HTN, L THR anterior approach, hx of R THR     FOTO  L hip  10/4 = 25/57  10 = 46/57   = 58/57  12 = 60/57    R knee   = 39/65   = 48/65     Manual          LLE hamstring stretch, gastroc stretch, gentle ER stretch DS - hams, ER, quad DS - hams, ER, quad DS - hams, ER, quad DS - hams, ER, quad DS - hams, ER, quad                       Eval R knee              R - hams stretch, quad stretch, STM to quad DS DS DS DS DS - hams, ER, quad                                      Exercise Diary          Bike L4 10' L4 12' L4 12' L4 10' L4 10'        Slant board   30"x6 30"x6         Fwd step up             Lateral step up    8" x20 each 8" x20 each        Step downs SLR flex - R 2x10            Leg press  S/L 85# 2x10 B/L   S/l 85# 2x10        Hip abd machine             Tband side step NV             s/l clamshells [place] hip halo  2x10 B/L                  s/l hip abd   NV  2x10 B/L  2x10 B/L  2x10 B/L  LLE 3" 2x10         Standing hip abd [place] hip halo 2x10 each GTB 2x10 each GTB 2x10 each GTB 2x10 each GTB 2x10 each              Modalities

## 2020-01-16 ENCOUNTER — OFFICE VISIT (OUTPATIENT)
Dept: PHYSICAL THERAPY | Facility: REHABILITATION | Age: 65
End: 2020-01-16
Payer: COMMERCIAL

## 2020-01-16 DIAGNOSIS — R26.9 GAIT ABNORMALITY: ICD-10-CM

## 2020-01-16 DIAGNOSIS — Z96.642 HISTORY OF TOTAL LEFT HIP REPLACEMENT: Primary | ICD-10-CM

## 2020-01-16 DIAGNOSIS — M25.561 ACUTE PAIN OF RIGHT KNEE: ICD-10-CM

## 2020-01-16 PROCEDURE — 97110 THERAPEUTIC EXERCISES: CPT | Performed by: PHYSICAL THERAPIST

## 2020-01-16 PROCEDURE — 97112 NEUROMUSCULAR REEDUCATION: CPT | Performed by: PHYSICAL THERAPIST

## 2020-01-16 NOTE — PROGRESS NOTES
Daily Note     Today's date: 2020  Patient name: Chiquis Massey  : 1955  MRN: [id number]  Referring provider: JERRI Reed*  Dx:   Encounter Diagnosis     ICD-10-CM    1  History of total left hip replacement Z96 642    2  Gait abnormality R26 9    3  Acute pain of right knee M25 561        Start Time: 0930  Stop Time: 1015  Total time in clinic (min): 45 minutes    Subjective: Pt states he was standing for hours yesterday and is more sore today as a result      Objective: See treatment diary below      Assessment: Pt fatigued with t-band mini squat side stepping  Hip abductor strength is still decreased and pt would benefit from further strengthening  Pt will benefit from continued skilled outpatient PT to improve strength, to address therapy goals, to reduce pain, and improve function  Plan: Continue per plan of care  Progress treatment as tolerated  Next visit decrease bike to 5 min and focus more on LLE strengthening and neuromuscular control exercises       Precautions: HTN, L THR anterior approach, hx of R THR     FOTO  L hip  10/4 = 25/57  10/21 = 46/57   = 58/57   = 60/57       Manual         LLE hamstring stretch, gastroc stretch, gentle ER stretch DS - hams, ER, quad DS - hams, ER, quad DS - hams, ER, quad DS - hams, ER, quad DS - hams, ER, quad DS - hams, ER, quad                      Eval R knee              R - hams stretch, quad stretch, STM to quad DS DS DS DS DS - hams, ER, quad DS - hams, ER, quad                                     Exercise Diary         Bike L4 10' L4 12' L4 12' L4 10' L4 10' L4 5'       Slant board   30"x6 30"x6         Fwd step up             Lateral step up    8" x20 each 8" x20 each 8" x20 each       Step downs             SLR flex - R 2x10            Leg press  S/L 85# 2x10 B/L   S/l 85# 2x10 S/l 85# 3x10       Hip abd machine             Tband side step NV     W/ minisquat GTB x20 ft L and R        s/l clamshells [place] hip halo  2x10 B/L                  s/l hip abd   NV  2x10 B/L  2x10 B/L  2x10 B/L  LLE 3" 2x10  2x10 B/L       Standing hip abd [place] hip halo 2x10 each GTB 2x10 each GTB 2x10 each GTB 2x10 each GTB 2x10 each GTB 2x10 each             Modalities

## 2020-01-20 ENCOUNTER — OFFICE VISIT (OUTPATIENT)
Dept: PHYSICAL THERAPY | Facility: REHABILITATION | Age: 65
End: 2020-01-20
Payer: COMMERCIAL

## 2020-01-20 DIAGNOSIS — Z96.642 HISTORY OF TOTAL LEFT HIP REPLACEMENT: Primary | ICD-10-CM

## 2020-01-20 DIAGNOSIS — M25.561 ACUTE PAIN OF RIGHT KNEE: ICD-10-CM

## 2020-01-20 DIAGNOSIS — R26.9 GAIT ABNORMALITY: ICD-10-CM

## 2020-01-20 PROCEDURE — 97112 NEUROMUSCULAR REEDUCATION: CPT | Performed by: PHYSICAL THERAPIST

## 2020-01-20 PROCEDURE — 97110 THERAPEUTIC EXERCISES: CPT | Performed by: PHYSICAL THERAPIST

## 2020-01-20 NOTE — PROGRESS NOTES
Daily Note     Today's date: 2020  Patient name: Cyn Salvador  : 1955  MRN: [id number]  Referring provider: JERRI Figueroa*  Dx:   Encounter Diagnosis     ICD-10-CM    1  History of total left hip replacement Z96 642    2  Gait abnormality R26 9    3  Acute pain of right knee M25 561        Start Time: 0932  Stop Time: 1017  Total time in clinic (min): 45 minutes    Subjective: Pt states he was a little sore after last visit, but feels like stairs are getting easier now  Objective: See treatment diary below      Assessment: Pt responded well to progression in standing hip abd with band  He required initial cues during side lying hip and for proper form with pt tending to compensate with hip flexion  With cues he was able to perform correctly  Increased time to complete exercises today with pt taking more rest breaks  Pt will benefit from continued skilled outpatient PT to improve strength, to address therapy goals, to reduce pain, and improve function  Plan: Continue per plan of care  Progress treatment as tolerated  Focus on LLE strengthening and neuromuscular control exercises       Precautions: HTN, L THR anterior approach, hx of R THR     FOTO  L hip  10/4 = 25/57  10/21 = 46/57   = 58/57  12 = 60/57       Manual        LLE hamstring stretch, gastroc stretch, gentle ER stretch DS - hams, ER, quad DS - hams, ER, quad DS - hams, ER, quad DS - hams, ER, quad DS - hams, ER, quad DS - hams, ER, quad DS - hams, ER, quad                     Eval R knee              R - hams stretch, quad stretch, STM to quad DS DS DS DS DS - hams, ER, quad DS - hams, ER, quad DS - hams, ER, quad                                    Exercise Diary        Bike L4 10' L4 12' L4 12' L4 10' L4 10' L4 5' L4 5'      Slant board   30"x6 30"x6         Fwd step up             Lateral step up    8" x20 each 8" x20 each 8" x20 each NV Step downs             SLR flex - R 2x10            Leg press  S/L 85# 2x10 B/L   S/l 85# 2x10 S/l 85# 3x10 S/l 85# 3x10      Hip abd machine             Tband side step NV     W/ minisquat GTB x20 ft L and R W/ minisquat GTB 2x20 ft L and R       s/l clamshells [place] hip halo  2x10 B/L                  s/l hip abd   NV  2x10 B/L  2x10 B/L  2x10 B/L  LLE 3" 2x10  2x10 B/L 2x10 B/L      Standing hip abd [place] hip halo 2x10 each GTB 2x10 each GTB 2x10 each GTB 2x10 each GTB 2x10 each GTB 2x10 each BTB 2x10 each            Modalities

## 2020-01-23 ENCOUNTER — OFFICE VISIT (OUTPATIENT)
Dept: PHYSICAL THERAPY | Facility: REHABILITATION | Age: 65
End: 2020-01-23
Payer: COMMERCIAL

## 2020-01-23 DIAGNOSIS — R26.9 GAIT ABNORMALITY: ICD-10-CM

## 2020-01-23 DIAGNOSIS — M25.561 ACUTE PAIN OF RIGHT KNEE: ICD-10-CM

## 2020-01-23 DIAGNOSIS — Z96.642 HISTORY OF TOTAL LEFT HIP REPLACEMENT: Primary | ICD-10-CM

## 2020-01-23 PROCEDURE — 97112 NEUROMUSCULAR REEDUCATION: CPT | Performed by: PHYSICAL THERAPIST

## 2020-01-23 PROCEDURE — 97110 THERAPEUTIC EXERCISES: CPT | Performed by: PHYSICAL THERAPIST

## 2020-01-23 NOTE — PROGRESS NOTES
Daily Note     Today's date: 2020  Patient name: Anjel Mcnamara  : 1955  MRN: [id number]  Referring provider: JERRI Narvaez*  Dx:   Encounter Diagnosis     ICD-10-CM    1  History of total left hip replacement Z96 642    2  Gait abnormality R26 9    3  Acute pain of right knee M25 561        Start Time: 0930  Stop Time: 1016  Total time in clinic (min): 46 minutes    Subjective:  Pt states he tried to use DMSO cream on the knee and then the knee was more painful, but unsure if this was due to the cream  States he has some DOMS after last visit as well, but the good kind of sore  Objective: See treatment diary below      Assessment: Pt was appropriately challenged by progression in leg press  He is making slow, but steady progress  Pt will benefit from continued skilled outpatient PT to improve strength, to address therapy goals, to reduce pain, and improve function  Plan: Continue per plan of care  Progress treatment as tolerated  Focus on LLE strengthening and neuromuscular control exercises       Precautions: HTN, L THR anterior approach, hx of R THR    FOTO  L hip  10/4 = 25/57  10/21 = 46/57   = 58/57   = 60/57       Manual       LLE hamstring stretch, gastroc stretch, gentle ER stretch DS - hams, ER, quad DS - hams, ER, quad DS - hams, ER, quad DS - hams, ER, quad DS - hams, ER, quad DS - hams, ER, quad DS - hams, ER, quad DS - hams, ER, quad                    Eval R knee              R - hams stretch, quad stretch, STM to quad DS DS DS DS DS - hams, ER, quad DS - hams, ER, quad DS - hams, ER, quad DS - hams, ER, quad                                   Exercise Diary       Bike L4 10' L4 12' L4 12' L4 10' L4 10' L4 5' L4 5' L4 5'     Slant board   30"x6 30"x6         Fwd step up             Lateral step up    8" x20 each 8" x20 each 8" x20 each NV      Step downs             SLR flex - R 2x10            Leg press  S/L 85# 2x10 B/L   S/l 85# 2x10 S/l 85# 3x10 S/l 85# 3x10 S/l 90# 3x10     Hip abd machine             Tband side step NV     W/ minisquat GTB x20 ft L and R W/ minisquat GTB 2x20 ft L and R W/ minisquat GTB 2x20 ft L and R      s/l clamshells [place] hip halo  2x10 B/L                  s/l hip abd   NV  2x10 B/L  2x10 B/L  2x10 B/L  LLE 3" 2x10  2x10 B/L 2x10 B/L 2x10 B/L     Standing hip abd [place] hip halo 2x10 each GTB 2x10 each GTB 2x10 each GTB 2x10 each GTB 2x10 each GTB 2x10 each BTB 2x10 each BTB 2x10 each           Modalities

## 2020-01-27 ENCOUNTER — OFFICE VISIT (OUTPATIENT)
Dept: PHYSICAL THERAPY | Facility: REHABILITATION | Age: 65
End: 2020-01-27
Payer: COMMERCIAL

## 2020-01-27 DIAGNOSIS — R26.9 GAIT ABNORMALITY: ICD-10-CM

## 2020-01-27 DIAGNOSIS — M25.561 ACUTE PAIN OF RIGHT KNEE: ICD-10-CM

## 2020-01-27 DIAGNOSIS — Z96.642 HISTORY OF TOTAL LEFT HIP REPLACEMENT: Primary | ICD-10-CM

## 2020-01-27 PROCEDURE — 97110 THERAPEUTIC EXERCISES: CPT | Performed by: PHYSICAL THERAPIST

## 2020-01-27 PROCEDURE — 97112 NEUROMUSCULAR REEDUCATION: CPT | Performed by: PHYSICAL THERAPIST

## 2020-01-27 NOTE — PROGRESS NOTES
Daily Note     Today's date: 2020  Patient name: Cyn Salvador  : 1955  MRN: [id number]  Referring provider: JERRI Figueroa*  Dx:   Encounter Diagnosis     ICD-10-CM    1  History of total left hip replacement Z96 642    2  Gait abnormality R26 9    3  Acute pain of right knee M25 561        Start Time: 0933  Stop Time: 1018  Total time in clinic (min): 45 minutes    Subjective:  Pt states he isn't feeling too bad today  Objective: See treatment diary below      Assessment: Pt fatigued with progression in minisquat side steps, but toelrated well  Pt is independent in program and ready to D/C to HEP  Planning for D/C next visit  Plan: Potential discharge next visit       Precautions: HTN, L THR anterior approach, hx of R THR    FOTO  L hip  10/4 = 25/57  10/21 = 46/57   = 58/57   = 60/57       Manual      LLE hamstring stretch, gastroc stretch, gentle ER stretch DS - hams, ER, quad DS - hams, ER, quad DS - hams, ER, quad DS - hams, ER, quad DS - hams, ER, quad DS - hams, ER, quad DS - hams, ER, quad DS - hams, ER, quad DS - hams, ER, quad                   Eval R knee              R - hams stretch, quad stretch, STM to quad DS DS DS DS DS - hams, ER, quad DS - hams, ER, quad DS - hams, ER, quad DS - hams, ER, quad DS - hams, ER, quad                                  Exercise Diary      Bike L4 10' L4 12' L4 12' L4 10' L4 10' L4 5' L4 5' L4 5' L4 5'    Slant board   30"x6 30"x6         Fwd step up             Lateral step up    8" x20 each 8" x20 each 8" x20 each NV      Step downs             SLR flex - R 2x10            Leg press  S/L 85# 2x10 B/L   S/l 85# 2x10 S/l 85# 3x10 S/l 85# 3x10 S/l 90# 3x10 S/l 95# 3x10    Hip abd machine             Tband side step NV     W/ minisquat GTB x20 ft L and R W/ minisquat GTB 2x20 ft L and R W/ minisquat GTB 2x20 ft L and R W/ minisquat blue hip halo 2x20 ft L and R     s/l clamshells [place] hip halo  2x10 B/L                  s/l hip abd   NV  2x10 B/L  2x10 B/L  2x10 B/L  LLE 3" 2x10  2x10 B/L 2x10 B/L 2x10 B/L 2x10 B/L    Standing hip abd [place] hip halo 2x10 each GTB 2x10 each GTB 2x10 each GTB 2x10 each GTB 2x10 each GTB 2x10 each BTB 2x10 each BTB 2x10 each BTB 2x10 each          Modalities

## 2020-01-30 ENCOUNTER — OFFICE VISIT (OUTPATIENT)
Dept: PHYSICAL THERAPY | Facility: REHABILITATION | Age: 65
End: 2020-01-30
Payer: COMMERCIAL

## 2020-01-30 DIAGNOSIS — Z96.642 HISTORY OF TOTAL LEFT HIP REPLACEMENT: Primary | ICD-10-CM

## 2020-01-30 DIAGNOSIS — M25.561 ACUTE PAIN OF RIGHT KNEE: ICD-10-CM

## 2020-01-30 DIAGNOSIS — R26.9 GAIT ABNORMALITY: ICD-10-CM

## 2020-01-30 PROCEDURE — 97110 THERAPEUTIC EXERCISES: CPT | Performed by: PHYSICAL THERAPIST

## 2020-01-30 PROCEDURE — 97530 THERAPEUTIC ACTIVITIES: CPT | Performed by: PHYSICAL THERAPIST

## 2020-01-30 NOTE — PROGRESS NOTES
PT Re-Evaluation  and PT Discharge    Today's date: 2020  Patient name: Cindy Kay  : 1955  MRN: [id number]  Referring provider: JERRI Rutherford*  Dx:   Encounter Diagnosis     ICD-10-CM    1  History of total left hip replacement Z96 642    2  Gait abnormality R26 9    3  Acute pain of right knee M25 561        Start Time: 930  Stop Time: 1015  Total time in clinic (min): 45 minutes    Assessment  Assessment details: Pt presents today for 30th therapy visit s/p L THR  At this time the pt has met all therapy goals and feels 95% improvement of sx's  L hip AROM is full and pain free, LLE strength has improved, and pain has reduced to a max of 3/10  Pt is pleased with progress and independent in HEP  Pt should continue to improve on own with home program  Pt thanked therapist and feels ready for discharge from therapy  Goals  Short term goals: to be met in 4 weeks  Pt independent with initial HEP, rationale, technique and frequency, for ROM and pain control  MET  Improve L hip flex, abd to 3/5 of greater for improved ease of transfers and ADLs MET  Pt will manage 10-15 minutes of continuous activity for general conditioning and endorphin release for pain management using Bike  MET  Pt will be able to stand/walk for at least 30 min without an assistive device, independently without an increase in pain or stiffness to be able to perform work/household duties  MET  Pt will perform the TUG test in less than <14 seconds indicating decreased risk for falls  MET  Pt able to perform 1 sit to stand without UE support to better manage functional transfers MET      Long term goals: to be met in 8 weeks  Pt will have WFL and pain free L hip ROM which is required for transfers, dressing/bathing, and ambulation  MET  Pt will report a max pain level of 2/10 indicating an overall improvement in hip pain   NOT MET, 3/10  Pt will be able to asc/desc a flight of stairs step over step, Mod I with minimal to no hip pain to be able to get to and from second floor of house  MET  Pt will improve L hip strength to at least a 4/5 throughout for improved functional mobility and joint protection  MET  Pt able to perform 5 times sit to stand test in <15 sec indicating improved functional strength and decreased risk for falls  MET  Pt independent in final HEP to maintain gains made in therapy MET  Pt will improve FOTO score to better then expected outcome indicating an overall improvement in pain and function  MET        Subjective Evaluation    History of Present Illness  Mechanism of injury: Pt reports his hip feels 95% back to full function  States he still is careful when carrying item up/down stairs, but pleased with his progress and feels ready to DC to [facility]  Pain  Current pain ratin  At best pain ratin  At worst pain rating: 3          Objective     Active Range of Motion     Additional Active Range of Motion Details  L hip AROM WNL and pain free    Strength/Myotome Testing     Left Hip   Planes of Motion   Flexion: 4  Extension: 4+  Abduction: 4-    General Comments:      Hip Comments   5 times sit to stand = 13 sec    TUG test = 8 sec without a device    Flowsheet Rows      Most Recent Value   PT/OT G-Codes   Current Score  69   Projected Score  57           Precautions: HTN, L THR anterior approach, hx of R THR          Manual     LLE hamstring stretch, gastroc stretch, gentle ER stretch DS - hams, ER, quad DS - hams, ER, quad DS - hams, ER, quad DS - hams, ER, quad DS - hams, ER, quad DS - hams, ER, quad DS - hams, ER, quad DS - hams, ER, quad DS - hams, ER, quad  DS - hams, ER, quad                            Eval R knee                        R - hams stretch, quad stretch, STM to quad DS DS DS DS DS - hams, ER, quad DS - hams, ER, quad DS - hams, ER, quad DS - hams, ER, quad DS - hams, ER, quad                                   Exercise Diary   12/24 1/6 1/9 1/16 1/20 1/23 1/27 1/30   Bike L4 10' L4 12' L4 12' L4 10' L4 8' L4 5' L4 5' L4 5' L4 5'     Slant board     30"x6 30"x6               Fwd step up                       Lateral step up       8" x20 each 8" x20 each 8" x20 each NV      TUG test  5 times sit to stand   Step downs                    MMT   SLR flex - R 2x10                  HEP updated 15' - hams stretch, quad stretch, piriformi s stretch   Leg press   S/L 85# 2x10 B/L     S/l 85# 2x10 S/l 85# 3x10 S/l 85# 3x10 S/l 90# 3x10 S/l 95# 3x10     Hip abd machine                       Tband side step NV         W/ minisquat GTB x20 ft L and R W/ minisquat GTB 2x20 ft L and R W/ minisquat GTB 2x20 ft L and R W/ minisquat blue hip halo 2x20 ft L and R      s/l clamshells [place] hip halo  2x10 B/L                      s/l hip abd   NV  2x10 B/L  2x10 B/L  2x10 B/L  LLE 3" 2x10  2x10 B/L 2x10 B/L 2x10 B/L 2x10 B/L     Standing hip abd [place] hip halo 2x10 each GTB 2x10 each GTB 2x10 each GTB 2x10 each GTB 2x10 each GTB 2x10 each BTB 2x10 each BTB 2x10 each BTB 2x10 each           Modalities

## 2021-03-21 ENCOUNTER — HOSPITAL ENCOUNTER (EMERGENCY)
Facility: HOSPITAL | Age: 66
Discharge: HOME | End: 2021-03-21
Attending: EMERGENCY MEDICINE | Admitting: EMERGENCY MEDICINE
Payer: MEDICARE

## 2021-03-21 ENCOUNTER — APPOINTMENT (EMERGENCY)
Dept: RADIOLOGY | Facility: HOSPITAL | Age: 66
End: 2021-03-21
Payer: MEDICARE

## 2021-03-21 VITALS
DIASTOLIC BLOOD PRESSURE: 75 MMHG | WEIGHT: 250.44 LBS | SYSTOLIC BLOOD PRESSURE: 152 MMHG | OXYGEN SATURATION: 99 % | RESPIRATION RATE: 18 BRPM | TEMPERATURE: 98 F | BODY MASS INDEX: 35.93 KG/M2 | HEART RATE: 52 BPM

## 2021-03-21 DIAGNOSIS — S62.309A METACARPAL BONE FRACTURE: ICD-10-CM

## 2021-03-21 DIAGNOSIS — S20.219A CHEST WALL CONTUSION: Primary | ICD-10-CM

## 2021-03-21 PROCEDURE — 99283 EMERGENCY DEPT VISIT LOW MDM: CPT

## 2021-03-21 PROCEDURE — 71120 X-RAY EXAM BREASTBONE 2/>VWS: CPT

## 2021-03-21 PROCEDURE — 29125 APPL SHORT ARM SPLINT STATIC: CPT | Performed by: PHYSICIAN ASSISTANT

## 2021-03-21 PROCEDURE — 73130 X-RAY EXAM OF HAND: CPT

## 2021-03-21 PROCEDURE — 71046 X-RAY EXAM CHEST 2 VIEWS: CPT

## 2021-03-21 PROCEDURE — 99284 EMERGENCY DEPT VISIT MOD MDM: CPT | Performed by: PHYSICIAN ASSISTANT

## 2021-03-21 RX ORDER — OXYCODONE HYDROCHLORIDE AND ACETAMINOPHEN 5; 325 MG/1; MG/1
1 TABLET ORAL ONCE
Status: COMPLETED | OUTPATIENT
Start: 2021-03-21 | End: 2021-03-21

## 2021-03-21 RX ORDER — OXYCODONE HYDROCHLORIDE AND ACETAMINOPHEN 5; 325 MG/1; MG/1
1 TABLET ORAL EVERY 6 HOURS PRN
Qty: 6 TABLET | Refills: 0 | Status: SHIPPED | OUTPATIENT
Start: 2021-03-21

## 2021-03-21 RX ADMIN — OXYCODONE HYDROCHLORIDE AND ACETAMINOPHEN 1 TABLET: 5; 325 TABLET ORAL at 09:29

## 2021-03-21 NOTE — DISCHARGE INSTRUCTIONS
Use incentive spirometer 10 times an hour while awake  Ice and elevate your hand for 20 minutes 3 to 4 times a day as needed for comfort  Leave the splint intact until your orthopedic follow-up

## 2021-03-21 NOTE — ED PROVIDER NOTES
History  Chief Complaint   Patient presents with    Fall     Tripped and fell off curb last night, c/o right hand pain, pain under right arm  Denies hitting head, no loc, takes aspirin        History provided by:  Patient  Fall  Mechanism of injury: fall    Injury location: right hand an d anterior chest wall  Arrived directly from scene: no    Fall:     Fall occurred:  Standing    Impact surface:  Hard floor    Point of impact: chest   Suspicion of alcohol use: no    Suspicion of drug use: no    Tetanus status:  Up to date  Prior to arrival data:     Bystander interventions:  None    Patient ambulatory at scene: yes      Blood loss:  None    Responsiveness at scene:  Alert    Orientation at scene:  Person, place, situation and time    Loss of consciousness: no      Amnesic to event: no    Associated symptoms: chest pain and vomiting    Associated symptoms: no abdominal pain, no back pain, no blindness, no difficulty breathing, no headaches, no hearing loss, no loss of consciousness, no nausea, no neck pain and no seizures    Risk factors: no AICD, no anticoagulation therapy, no asthma, no beta blocker therapy, no CABG, no CAD, no CHF, no COPD, no diabetes, no dialysis, no hemophilia, no kidney disease, no pacemaker, no past MI and no steroid use        Prior to Admission Medications   Prescriptions Last Dose Informant Patient Reported? Taking?    ALLOPURINOL PO   Yes No   Sig: Take by mouth   Atorvastatin Calcium (LIPITOR PO)   Yes No   Sig: Take by mouth   Cimetidine (TAGAMET PO)   Yes No   Sig: Take by mouth   Cyanocobalamin (B-12 PO)   Yes No   Sig: Take by mouth   DOXAZOSIN MESYLATE PO   Yes No   Sig: Take by mouth   LISINOPRIL PO   Yes No   Sig: Take by mouth   METOPROLOL TARTRATE PO   Yes No   Sig: Take by mouth   Multiple Vitamins-Minerals (MULTIVITAMIN ADULTS PO)   Yes No   Sig: Take by mouth   aspirin 325 mg tablet   Yes No   Sig: Take 325 mg by mouth daily   oxyCODONE-acetaminophen (ROXICET) 5-325 mg/5 mL solution   Yes No   Sig: Take by mouth   traMADol (ULTRAM) 50 mg tablet   Yes No   Sig: Take by mouth      Facility-Administered Medications: None       Past Medical History:   Diagnosis Date    Mehta esophagus     Gastric bypass status for obesity     Hypertension        Past Surgical History:   Procedure Laterality Date    TOTAL HIP ARTHROPLASTY Right     TOTAL HIP ARTHROPLASTY Left 09/26/2019       History reviewed  No pertinent family history  I have reviewed and agree with the history as documented  E-Cigarette/Vaping     E-Cigarette/Vaping Substances     Social History     Tobacco Use    Smoking status: Former Smoker    Smokeless tobacco: Never Used   Substance Use Topics    Alcohol use: No    Drug use: No       Review of Systems   Constitutional: Positive for activity change  Negative for appetite change, chills, fatigue and fever  HENT: Negative for hearing loss  Eyes: Negative for blindness  Respiratory: Negative for chest tightness and shortness of breath  Cardiovascular: Positive for chest pain  Gastrointestinal: Positive for vomiting  Negative for abdominal pain and nausea  Musculoskeletal: Positive for arthralgias and joint swelling  Negative for back pain, gait problem, myalgias and neck pain  Skin: Positive for wound  Negative for color change, pallor and rash  Neurological: Negative for seizures, loss of consciousness and headaches  Psychiatric/Behavioral: Negative for confusion  All other systems reviewed and are negative  Physical Exam  Physical Exam  Vitals signs and nursing note reviewed  Constitutional:       General: He is not in acute distress  Appearance: Normal appearance  He is obese  He is not ill-appearing, toxic-appearing or diaphoretic  HENT:      Head: Normocephalic  Right Ear: External ear normal       Left Ear: External ear normal    Eyes:      General:         Right eye: No discharge  Left eye: No discharge  Conjunctiva/sclera: Conjunctivae normal    Neck:      Musculoskeletal: Neck supple  Cardiovascular:      Rate and Rhythm: Normal rate and regular rhythm  Heart sounds: No murmur  Pulmonary:      Effort: Pulmonary effort is normal       Comments: The chest is atraumatic upon inspection  There is tenderness palpated over the mid sternal area  There is no tenderness palpated over the cartilage  There is no crepitance present  Chest:      Chest wall: Tenderness present  Abdominal:      General: Abdomen is flat  There is no distension  Tenderness: There is no abdominal tenderness  There is no guarding or rebound  Musculoskeletal:      Comments: Inspection of the right hand and wrist   There is swelling noted over the dorsal aspect of the 4th and 5th metacarpals  There is good range of motion of the hand in all planes  There is some tenderness with wrist flexion  The remainder of the wrist forearm and elbow are nontender with good range of motion  There is no rotational component of the fingers with flexion  There are palpable pulses over the ulnar and radial arteries that are +2 and symmetrical   Capillary refill is less than 2 seconds  Skin:     General: Skin is warm  Capillary Refill: Capillary refill takes less than 2 seconds  Neurological:      Mental Status: He is alert and oriented to person, place, and time  Psychiatric:         Mood and Affect: Mood normal          Behavior: Behavior normal          Thought Content:  Thought content normal          Judgment: Judgment normal          Vital Signs  ED Triage Vitals [03/21/21 0855]   Temperature Pulse Respirations Blood Pressure SpO2   98 °F (36 7 °C) (!) 52 18 152/75 99 %      Temp Source Heart Rate Source Patient Position - Orthostatic VS BP Location FiO2 (%)   Oral Monitor -- Right arm --      Pain Score       5           Vitals:    03/21/21 0855   BP: 152/75   Pulse: (!) 52         Visual Acuity      ED Medications  Medications   oxyCODONE-acetaminophen (PERCOCET) 5-325 mg per tablet 1 tablet (1 tablet Oral Given 3/21/21 1041)       Diagnostic Studies  Results Reviewed     None                 XR chest 2 views   ED Interpretation by Beth Maria PA-C (03/21 4806)   No acute cardiopulmonary disease       by Bethene Lesches, MD (03/21 1211)      XR sternum minimum 2 views   ED Interpretation by Beth Maria PA-C (03/21 9604)   No fracture      XR hand 3+ views RIGHT   ED Interpretation by Beth Maria PA-C (03/21 2495)   Fracture of the proximal 5th metacarpal                  Procedures  Splint application    Date/Time: 3/21/2021 9:39 AM  Performed by: Beth Maria PA-C  Authorized by: Beth Maria PA-C   Universal Protocol:  Consent: Verbal consent obtained  Risks and benefits: risks, benefits and alternatives were discussed  Consent given by: patient  Patient understanding: patient states understanding of the procedure being performed  Site marked: the operative site was marked  Radiology Images displayed and confirmed  If images not available, report reviewed: imaging studies available  Required items: required blood products, implants, devices, and special equipment available  Patient identity confirmed: verbally with patient      Pre-procedure details:     Sensation:  Normal  Procedure details:     Laterality:  Right    Location:  Hand    Hand:  R hand    Strapping: no      Splint type:  Ulnar gutter    Supplies:  Fiberglass  Post-procedure details:     Pain:  Improved    Sensation:  Normal    Patient tolerance of procedure: Tolerated with difficulty  Comments:      Ulnar splint applied to the patient's right hand by me               ED Course                                           MDM  Number of Diagnoses or Management Options  Chest wall contusion: new and requires workup  Metacarpal bone fracture: new and requires workup     Amount and/or Complexity of Data Reviewed  Tests in the radiology section of CPT®: ordered and reviewed  Tests in the medicine section of CPT®: ordered and reviewed    Risk of Complications, Morbidity, and/or Mortality  Presenting problems: moderate  Diagnostic procedures: moderate  Management options: moderate  General comments: Patient presented to the emergency room after tripping and falling and landing on his chest and right outstretched hand  He complained of pain over his midsternal area as well as his right hand  He was seen and evaluated  X-rays were taken of the chest which did not demonstrate any acute cardiopulmonary disease  Dedicated sternal views did not demonstrate a fracture  X-rays of the right hand demonstrated a minimally displaced fracture of the proximal 5th metacarpal of the right hand  Patient was medicated with Percocet  An ulnar gutter splint was applied by me  It was in satisfactory position  Patient was given a prescription for 6 Percocet to take, 1 pill every 6 hours as needed  He was given an orthopedic referral for follow-up  He will ice and elevate his hand for comfort  He is to leave the splint intact and dry  He will call and arrange a follow-up appointment tomorrow          Disposition  Final diagnoses:   Chest wall contusion - mid sternal   Metacarpal bone fracture - Acute minimally displaced fracture of the proximal right 5th metacarpal right hand     Time reflects when diagnosis was documented in both MDM as applicable and the Disposition within this note     Time User Action Codes Description Comment    3/21/2021  9:39 AM Jairo Rodriguez Add Donald Rony Chest wall contusion     3/21/2021  9:39 AM Jairo Rodriguez Modify [F72 059S] Chest wall contusion mid sternal    3/21/2021  9:40 AM Jairo Rodriguez Add [G95 [id number]] Metacarpal bone fracture     3/21/2021  9:41 AM Amalia Cintron Modify [S62 [id number]] Metacarpal bone fracture Acute minimally displaced fracture of the proximal right 5th metacarpal     3/21/2021 9:41 AM Jairo Barrientos [[id number]] Metacarpal bone fracture Acute minimally displaced fracture of the proximal right 5th metacarpal right hand      ED Disposition     ED Disposition Condition Date/Time Comment    Discharge Stable Sun Mar 21, 2021  9:38 AM Flaca Dixon discharge to home/self care  Follow-up Information     Follow up With Specialties Details Why Contact Info    Milad Angel MD Orthopedic Surgery Schedule an appointment as soon as possible for a visit  Tomorrow for follow-up Michele Curtis  [phone]            Discharge Medication List as of 3/21/2021  9:45 AM      START taking these medications    Details   oxyCODONE-acetaminophen (PERCOCET) 5-325 mg per tablet Take 1 tablet by mouth every 6 (six) hours as needed for moderate pain for up to 6 dosesMax Daily Amount: 4 tablets, Starting Sun 3/21/2021, Normal         CONTINUE these medications which have NOT CHANGED    Details   ALLOPURINOL PO Take by mouth, Historical Med      aspirin 325 mg tablet Take 325 mg by mouth daily, Historical Med      Atorvastatin Calcium (LIPITOR PO) Take by mouth, Historical Med      Cimetidine (TAGAMET PO) Take by mouth, Historical Med      Cyanocobalamin (B-12 PO) Take by mouth, Historical Med      DOXAZOSIN MESYLATE PO Take by mouth, Historical Med      LISINOPRIL PO Take by mouth, Historical Med      METOPROLOL TARTRATE PO Take by mouth, Historical Med      Multiple Vitamins-Minerals (MULTIVITAMIN ADULTS PO) Take by mouth, Historical Med      oxyCODONE-acetaminophen (ROXICET) 5-325 mg/5 mL solution Take by mouth, Historical Med      traMADol (ULTRAM) 50 mg tablet Take by mouth, Historical Med           No discharge procedures on file      PDMP Review     None          ED Provider  Electronically Signed by           Negrita Castellanos PA-C  03/21/21 0186  used

## 2021-03-21 NOTE — Clinical Note
Gus Hooper was seen and treated in our emergency department on 3/21/2021  No work until cleared by Family Doctor/Orthopedics        Diagnosis:     Vanessa Resendez    He may return on this date: If you have any questions or concerns, please don't hesitate to call        Pastor Altamirano PA-C    ______________________________           _______________          _______________  Hospital Representative                              Date                                Time

## 2021-03-22 NOTE — TELEPHONE ENCOUNTER
Patient is calling in wanting to make an appointment to be seen for a right hand fracture that happened on 3/20  He is wanting to be seen in [facility] with Dr Juanita Garcia, in which the Dr does not have anything available so information forwarded over to practice admin to assist further            Call back# [phone]

## 2021-03-25 VITALS
WEIGHT: 250 LBS | BODY MASS INDEX: 35.79 KG/M2 | SYSTOLIC BLOOD PRESSURE: 109 MMHG | DIASTOLIC BLOOD PRESSURE: 69 MMHG | HEART RATE: 62 BPM | HEIGHT: 70 IN

## 2021-03-25 DIAGNOSIS — S62.344A CLOSED NONDISPLACED FRACTURE OF BASE OF FOURTH METACARPAL BONE OF RIGHT HAND, INITIAL ENCOUNTER: Primary | ICD-10-CM

## 2021-03-25 PROCEDURE — 99204 OFFICE O/P NEW MOD 45 MIN: CPT | Performed by: SURGERY

## 2021-03-25 NOTE — PROGRESS NOTES
Shaina LARKIN  Attending, Orthopaedic Surgery  Hand, Wrist, and Elbow Surgery  [facility]      ORTHOPAEDIC HAND, WRIST, AND ELBOW OFFICE  VISIT       ASSESSMENT/PLAN:        60-year-old male here for his right avulsion base of the 4th metacarpal   X-rays were reviewed from the ED with the patient  Most of his exam did not directly correlate with the findings on the x-ray  Explained that is recommended to still immobilized with a cock-up wrist splint for the next 2 weeks  At that time new x-rays will be obtained to further evaluate for a possible nondisplaced fracture of the 4th  He can come out of the brace to do wrist  And hand range of motion  Range of motion was demonstrated to the patient today in the office  He can take the brace off for hygiene but recommended to sleep in the brace  Follow up in 2 weeks with another xray  The patient verbalized understanding of exam findings and treatment plan  We engaged in the shared decision-making process and treatment options were discussed at length with the patient  Surgical and conservative management discussed today along with risks and benefits  Diagnoses and all orders for this visit:    Closed nondisplaced fracture of base of fourth metacarpal bone of right hand, initial encounter  -     Cock Up Wrist Splint    Other orders  -     Cancel: Cock Up Wrist Splint        Follow Up:  Return in about 2 weeks (around 4/8/2021) for right hand  To Do Next Visit:  Re-evaluation of current issue and X-rays of the  right  hand      General Discussions:  Fracture - Nonoperative Care: The physiology of a fractured bone was discussed with the patient today  With non-displaced or minimally displaced fractures, conservative treatment such as casting or splinting often results in a functional recovery  Typically, these fractures are immobilized in either a cast or splint depending on the pattern    Radiographs are typically taken at intervals throughout the fracture healing to ensure that reduction or alignment is not lost   If the fracture loses its alignment, surgical intervention may be required to stabilize it  Medical conditions such as diabetes, osteoporosis, vitamin D deficiency, and a history of or exposure to smoking may delay or prevent fracture healing  Options between cast/splint immobilization and surgical treatment were offered and the risks and benefits of both were discussed  ____________________________________________________________________________________________________________________________________________      CHIEF COMPLAINT:  Chief Complaint   Patient presents with    Right Hand - Pain       SUBJECTIVE:  Dani Escobar is a 72y o  year old RHD male who presents today for an evaluation for his right base of the 4th and 5th metacarpal fractures that were sustained 03/20/2021 after a fall off a curb landing on the right hand  He was seen in the ED the next day on 03/21 for his further evaluated for a chest wall contusion and right hand pain  He was placed in a  Ulnar splint  Pain/symptom timing:  Worse during the day when active  Pain/symptom context:  Worse with activites and work  Pain/symptom modifying factors:  Rest makes better, activities make worse  Pain/symptom associated signs/symptoms: none    Prior treatment   · NSAIDsYes   · Injections No   · Bracing/Orthotics Yes    Physical Therapy No     I have personally reviewed all the relevant PMH, PSH, SH, FH, Medications and allergies      PAST MEDICAL HISTORY:  Past Medical History:   Diagnosis Date    Mehta esophagus     Gastric bypass status for obesity     Hypertension        PAST SURGICAL HISTORY:  Past Surgical History:   Procedure Laterality Date    TOTAL HIP ARTHROPLASTY Right     TOTAL HIP ARTHROPLASTY Left 09/26/2019       FAMILY HISTORY:  History reviewed  No pertinent family history      SOCIAL HISTORY:  Social History     Tobacco Use  Smoking status: Former Smoker    Smokeless tobacco: Never Used   Substance Use Topics    Alcohol use: No    Drug use: No       MEDICATIONS:    Current Outpatient Medications:     ALLOPURINOL PO, Take by mouth, Disp: , Rfl:     aspirin 325 mg tablet, Take 325 mg by mouth daily, Disp: , Rfl:     Atorvastatin Calcium (LIPITOR PO), Take by mouth, Disp: , Rfl:     Cimetidine (TAGAMET PO), Take by mouth, Disp: , Rfl:     Cyanocobalamin (B-12 PO), Take by mouth, Disp: , Rfl:     DOXAZOSIN MESYLATE PO, Take by mouth, Disp: , Rfl:     LISINOPRIL PO, Take by mouth, Disp: , Rfl:     METOPROLOL TARTRATE PO, Take by mouth, Disp: , Rfl:     Multiple Vitamins-Minerals (MULTIVITAMIN ADULTS PO), Take by mouth, Disp: , Rfl:     oxyCODONE-acetaminophen (PERCOCET) 5-325 mg per tablet, Take 1 tablet by mouth every 6 (six) hours as needed for moderate pain for up to 6 dosesMax Daily Amount: 4 tablets (Patient not taking: Reported on 3/25/2021), Disp: 6 tablet, Rfl: 0    oxyCODONE-acetaminophen (ROXICET) 5-325 mg/5 mL solution, Take by mouth, Disp: , Rfl:     traMADol (ULTRAM) 50 mg tablet, Take by mouth, Disp: , Rfl:     ALLERGIES:  Allergies   Allergen Reactions    Penicillins            REVIEW OF SYSTEMS:  Review of Systems   Constitutional: Negative for chills, fever and unexpected weight change  HENT: Negative for hearing loss, nosebleeds and sore throat  Eyes: Negative for pain, redness and visual disturbance  Respiratory: Negative for cough, shortness of breath and wheezing  Cardiovascular: Negative for chest pain, palpitations and leg swelling  Gastrointestinal: Negative for abdominal pain, nausea and vomiting  Endocrine: Negative for polydipsia and polyuria  Genitourinary: Negative for dysuria and hematuria  Skin: Negative for rash and wound  Neurological: Negative for dizziness, light-headedness and headaches     Psychiatric/Behavioral: Negative for decreased concentration, dysphoric mood and suicidal ideas  The patient is not nervous/anxious  VITALS:  Vitals:    03/25/21 1503   BP: 109/69   Pulse: 62       LABS:  HgA1c:   Lab Results   Component Value Date    HGBA1C 6 0 (H) 11/09/2020     BMP:   Lab Results   Component Value Date    CALCIUM 9 2 02/05/2019    K 4 2 02/05/2019    CO2 29 02/05/2019     02/05/2019    BUN 23 02/05/2019    CREATININE 0 87 02/05/2019       _____________________________________________________  PHYSICAL EXAMINATION:  General: well developed and well nourished, alert, oriented times 3 and appears comfortable  Psychiatric: Normal  HEENT: Normocephalic, Atraumatic Trachea Midline, No torticollis  Pulmonary: No audible wheezing or respiratory distress   Cardiovascular: No pitting edema, 2+ radial pulse   Skin: No masses, erythema, lacerations, fluctation, ulcerations  Neurovascular: Sensation Intact to the Median, Ulnar, Radial Nerve, Motor Intact to the Median, Ulnar, Radial Nerve and Pulses Intact  Musculoskeletal: Normal, except as noted in detailed exam and in HPI        MUSCULOSKELETAL EXAMINATION:  Right hand:   Mild swelling over the dorsal aspect of the hand  Ecchymosis noted over the dorsal aspect of the hand and on the browning surface along the 4th metacarpal shaft  No tenderness over the base of the 4th or 5th  Full composite fist  Opposition intact  Cross finger  Sensation intact to light touch  Brisk capillary refill   ___________________________________________________  STUDIES REVIEWED:  I have personally reviewed AP lateral and oblique radiographs of right hand 3 views  which demonstrate  DRUJ arthritis noted, ulnar positive, possible old fracture base of the 4th metacarpal       PROCEDURES PERFORMED:  Procedures  No Procedures performed today    _____________________________________________________      Loletta Quivers    I,:  Alisia Reyes am acting as a scribe while in the presence of the attending physician :       I,:  Kel Blakely MD personally performed the services described in this documentation    as scribed in my presence :

## 2021-04-08 ENCOUNTER — APPOINTMENT (OUTPATIENT)
Dept: RADIOLOGY | Facility: AMBULARY SURGERY CENTER | Age: 66
End: 2021-04-08
Attending: SURGERY
Payer: MEDICARE

## 2021-04-08 ENCOUNTER — OFFICE VISIT (OUTPATIENT)
Dept: OBGYN CLINIC | Facility: CLINIC | Age: 66
End: 2021-04-08
Payer: MEDICARE

## 2021-04-08 VITALS
HEART RATE: 59 BPM | DIASTOLIC BLOOD PRESSURE: 72 MMHG | HEIGHT: 70 IN | BODY MASS INDEX: 35.79 KG/M2 | SYSTOLIC BLOOD PRESSURE: 119 MMHG | WEIGHT: 250 LBS

## 2021-04-08 DIAGNOSIS — S62.344A CLOSED NONDISPLACED FRACTURE OF BASE OF FOURTH METACARPAL BONE OF RIGHT HAND, INITIAL ENCOUNTER: ICD-10-CM

## 2021-04-08 DIAGNOSIS — T14.8XXA CONTUSION OF BONE: Primary | ICD-10-CM

## 2021-04-08 PROCEDURE — 99213 OFFICE O/P EST LOW 20 MIN: CPT | Performed by: SURGERY

## 2021-04-08 PROCEDURE — 73130 X-RAY EXAM OF HAND: CPT

## 2021-04-08 RX ORDER — ESCITALOPRAM OXALATE 10 MG/1
TABLET ORAL
COMMUNITY
Start: 2021-04-03

## 2021-04-08 NOTE — PROGRESS NOTES
ASSESSMENT/PLAN:      72 y o  male with a right 4th metacarpal base contusion, DOI 03/20/2021  Right hand x-rays were obtained in the office and reviewed with Gerhardt Caper  It was discussed that that no fracture is appreciated on x-ray  He may d/c the use of the cock up wrist brace and use this on an as needed basis with strenuous activities  He may have injured his TFCC and ECU tendon  At this time nether of these things are bothersome  Activities to tolerance, no restrictions  Follow up in the office as needed if symptoms worsen or fail to improve  The patient verbalized understanding of exam findings and treatment plan  We engaged in the shared decision-making process and treatment options were discussed at length with the patient  Surgical and conservative management discussed today along with risks and benefits  Diagnoses and all orders for this visit:    Contusion of bone  -     XR hand 3+ vw right; Future    Other orders  -     escitalopram (LEXAPRO) 10 mg tablet      Follow Up:  Return if symptoms worsen or fail to improve  To Do Next Visit:  Re-evaluation of current issue    ____________________________________________________________________________________________________________________________________________      CHIEF COMPLAINT:  No chief complaint on file  SUBJECTIVE:  Fazal Jackson is a 72y o  year old RHD male who presents to the office today for a possible tight 4th metacarpal avulsion fracture  Overall Gerhardt Caper is doing well  Over the last few days he has been wearing the cock up wrist brace on an as needed basis with activities  Gerhardt Caper notes occasional pain to his right ulnar wrist  He denies any pain today  Gerhardt Caper states that he is using his right hand normally at this time  I have personally reviewed all the relevant PMH, PSH, SH, FH, Medications and allergies       PAST MEDICAL HISTORY:  Past Medical History:   Diagnosis Date    Mehta esophagus     Gastric bypass status for obesity     Hypertension        PAST SURGICAL HISTORY:  Past Surgical History:   Procedure Laterality Date    TOTAL HIP ARTHROPLASTY Right     TOTAL HIP ARTHROPLASTY Left 09/26/2019       FAMILY HISTORY:  History reviewed  No pertinent family history  SOCIAL HISTORY:  Social History     Tobacco Use    Smoking status: Former Smoker    Smokeless tobacco: Never Used   Substance Use Topics    Alcohol use: No    Drug use: No       MEDICATIONS:    Current Outpatient Medications:     ALLOPURINOL PO, Take by mouth, Disp: , Rfl:     aspirin 325 mg tablet, Take 325 mg by mouth daily, Disp: , Rfl:     Atorvastatin Calcium (LIPITOR PO), Take by mouth, Disp: , Rfl:     Cimetidine (TAGAMET PO), Take by mouth, Disp: , Rfl:     Cyanocobalamin (B-12 PO), Take by mouth, Disp: , Rfl:     DOXAZOSIN MESYLATE PO, Take by mouth, Disp: , Rfl:     escitalopram (LEXAPRO) 10 mg tablet, , Disp: , Rfl:     LISINOPRIL PO, Take by mouth, Disp: , Rfl:     METOPROLOL TARTRATE PO, Take by mouth, Disp: , Rfl:     Multiple Vitamins-Minerals (MULTIVITAMIN ADULTS PO), Take by mouth, Disp: , Rfl:     traMADol (ULTRAM) 50 mg tablet, Take by mouth, Disp: , Rfl:     oxyCODONE-acetaminophen (PERCOCET) 5-325 mg per tablet, Take 1 tablet by mouth every 6 (six) hours as needed for moderate pain for up to 6 dosesMax Daily Amount: 4 tablets (Patient not taking: Reported on 3/25/2021), Disp: 6 tablet, Rfl: 0    oxyCODONE-acetaminophen (ROXICET) 5-325 mg/5 mL solution, Take by mouth, Disp: , Rfl:     ALLERGIES:  Allergies   Allergen Reactions    Penicillins        REVIEW OF SYSTEMS:  Review of Systems   Constitutional: Negative for chills, fever and unexpected weight change  HENT: Negative for hearing loss, nosebleeds and sore throat  Eyes: Negative for pain, redness and visual disturbance  Respiratory: Negative for cough, shortness of breath and wheezing  Cardiovascular: Negative for chest pain, palpitations and leg swelling  Gastrointestinal: Negative for abdominal pain, nausea and vomiting  Endocrine: Negative for polydipsia and polyuria  Genitourinary: Negative for difficulty urinating and hematuria  Musculoskeletal: Negative for arthralgias, joint swelling and myalgias  Skin: Negative for rash and wound  Neurological: Negative for dizziness, numbness and headaches  Psychiatric/Behavioral: Negative for decreased concentration, dysphoric mood and suicidal ideas  The patient is not nervous/anxious  VITALS:  Vitals:    04/08/21 0927   BP: 119/72   Pulse: 59       LABS:  HgA1c:   Lab Results   Component Value Date    HGBA1C 6 0 (H) 11/09/2020     BMP:   Lab Results   Component Value Date    CALCIUM 9 2 02/05/2019    K 4 2 02/05/2019    CO2 29 02/05/2019     02/05/2019    BUN 23 02/05/2019    CREATININE 0 87 02/05/2019       _____________________________________________________  PHYSICAL EXAMINATION:  General: well developed and well nourished, alert, oriented times 3 and appears comfortable  Psychiatric: Normal  HEENT: Normocephalic, Atraumatic Trachea Midline, No torticollis  Pulmonary: No audible wheezing or respiratory distress   Cardiovascular: No pitting edema, 2+ radial pulse   Skin: No masses, erythema, lacerations, fluctation, ulcerations  Neurovascular: Sensation Intact to the Median, Ulnar, Radial Nerve, Motor Intact to the Median, Ulnar, Radial Nerve and Pulses Intact  Musculoskeletal: Normal, except as noted in detailed exam and in HPI        MUSCULOSKELETAL EXAMINATION:    Right hand:     No erythema, ecchymosis or edema  Mild tenderness to 4th metacarpal base   Non tender to palpation 3rd or 5th metacarpal   ECU and TFCC non tender to palpation   No pain with wrist extension   Full wrist ROM   Full extension and flexion of all digits   DPC 0     ___________________________________________________  STUDIES REVIEWED:  I have personally reviewed AP lateral and oblique radiographs of  Right hand demonstrates no acute fracture dislocation previously visualized cortical irregularity at the base of the 4th and 5th are no longer there          PROCEDURES PERFORMED:  Procedures  No Procedures performed today    _____________________________________________________      Asif Cure    I,:  Lexi Haji am acting as a scribe while in the presence of the attending physician :       I,:  Dai Tavarez MD personally performed the services described in this documentation    as scribed in my presence :